# Patient Record
Sex: MALE | Race: WHITE | NOT HISPANIC OR LATINO | Employment: OTHER | URBAN - METROPOLITAN AREA
[De-identification: names, ages, dates, MRNs, and addresses within clinical notes are randomized per-mention and may not be internally consistent; named-entity substitution may affect disease eponyms.]

---

## 2023-11-06 PROBLEM — Z95.0 CARDIAC PACEMAKER: Status: ACTIVE | Noted: 2023-11-06

## 2023-11-06 PROBLEM — R60.0 BILATERAL LOWER EXTREMITY EDEMA: Status: ACTIVE | Noted: 2023-11-06

## 2023-11-06 PROBLEM — I25.10 CAD (CORONARY ARTERY DISEASE): Status: ACTIVE | Noted: 2023-11-06

## 2023-11-06 PROBLEM — E78.5 DYSLIPIDEMIA: Status: ACTIVE | Noted: 2023-11-06

## 2023-11-06 PROBLEM — I71.20 THORACIC AORTIC ANEURYSM WITHOUT RUPTURE (CMS-HCC): Status: ACTIVE | Noted: 2023-11-06

## 2023-11-06 RX ORDER — BUSPIRONE HYDROCHLORIDE 15 MG/1
30 TABLET ORAL 2 TIMES DAILY
COMMUNITY

## 2023-11-06 RX ORDER — CLOPIDOGREL BISULFATE 75 MG/1
75 TABLET ORAL EVERY MORNING
COMMUNITY
End: 2023-11-07 | Stop reason: WASHOUT

## 2023-11-06 RX ORDER — NAPROXEN SODIUM 220 MG/1
81 TABLET, FILM COATED ORAL EVERY MORNING
COMMUNITY

## 2023-11-06 RX ORDER — LIDOCAINE 40 MG/G
CREAM TOPICAL
COMMUNITY
Start: 2022-03-12 | End: 2023-12-13 | Stop reason: ENTERED-IN-ERROR

## 2023-11-06 RX ORDER — BUSPIRONE HYDROCHLORIDE 30 MG/1
TABLET ORAL
COMMUNITY
End: 2023-12-13 | Stop reason: ENTERED-IN-ERROR

## 2023-11-06 RX ORDER — EZETIMIBE 10 MG/1
1 TABLET ORAL DAILY
COMMUNITY
Start: 2021-08-19

## 2023-11-06 RX ORDER — ESOMEPRAZOLE MAGNESIUM 40 MG/1
40 CAPSULE, DELAYED RELEASE ORAL DAILY PRN
COMMUNITY

## 2023-11-06 RX ORDER — FUROSEMIDE 20 MG/1
20 TABLET ORAL DAILY PRN
COMMUNITY
Start: 2020-12-19

## 2023-11-06 RX ORDER — METOPROLOL TARTRATE 25 MG/1
12.5 TABLET, FILM COATED ORAL 2 TIMES DAILY
COMMUNITY
Start: 2020-12-19

## 2023-11-06 RX ORDER — FLUOXETINE HYDROCHLORIDE 20 MG/1
60 CAPSULE ORAL EVERY MORNING
COMMUNITY

## 2023-11-06 RX ORDER — ALBUTEROL SULFATE 90 UG/1
2 AEROSOL, METERED RESPIRATORY (INHALATION) EVERY 4 HOURS PRN
COMMUNITY
Start: 2021-02-10

## 2023-11-06 RX ORDER — MULTIVIT-MIN/IRON FUM/FOLIC AC 7.5 MG-4
1 TABLET ORAL DAILY
COMMUNITY

## 2023-11-06 RX ORDER — ACETAMINOPHEN 500 MG
2000 TABLET ORAL EVERY MORNING
COMMUNITY

## 2023-11-06 RX ORDER — ROSUVASTATIN CALCIUM 40 MG/1
1 TABLET, COATED ORAL NIGHTLY
COMMUNITY

## 2023-11-06 RX ORDER — AMLODIPINE BESYLATE 2.5 MG/1
2.5 TABLET ORAL EVERY MORNING
COMMUNITY

## 2023-11-06 RX ORDER — NITROGLYCERIN 0.4 MG/1
TABLET SUBLINGUAL
COMMUNITY
Start: 2020-12-21

## 2023-11-06 RX ORDER — LORAZEPAM 1 MG/1
1 TABLET ORAL DAILY PRN
COMMUNITY

## 2023-11-07 ENCOUNTER — ANCILLARY PROCEDURE (OUTPATIENT)
Dept: CARDIOLOGY | Facility: CLINIC | Age: 58
End: 2023-11-07
Payer: OTHER GOVERNMENT

## 2023-11-07 ENCOUNTER — OFFICE VISIT (OUTPATIENT)
Dept: CARDIOLOGY | Facility: CLINIC | Age: 58
End: 2023-11-07
Payer: OTHER GOVERNMENT

## 2023-11-07 VITALS
SYSTOLIC BLOOD PRESSURE: 90 MMHG | HEART RATE: 72 BPM | BODY MASS INDEX: 36.29 KG/M2 | WEIGHT: 231.2 LBS | DIASTOLIC BLOOD PRESSURE: 68 MMHG | OXYGEN SATURATION: 97 % | TEMPERATURE: 97.8 F | HEIGHT: 67 IN

## 2023-11-07 DIAGNOSIS — Z95.0 CARDIAC PACEMAKER IN SITU: ICD-10-CM

## 2023-11-07 DIAGNOSIS — Z95.0 CARDIAC PACEMAKER: Primary | ICD-10-CM

## 2023-11-07 DIAGNOSIS — I49.5 SINOATRIAL NODE DYSFUNCTION (MULTI): ICD-10-CM

## 2023-11-07 PROCEDURE — 99213 OFFICE O/P EST LOW 20 MIN: CPT | Performed by: INTERNAL MEDICINE

## 2023-11-07 PROCEDURE — 93279 PRGRMG DEV EVAL PM/LDLS PM: CPT | Performed by: INTERNAL MEDICINE

## 2023-11-07 PROCEDURE — 1036F TOBACCO NON-USER: CPT | Performed by: INTERNAL MEDICINE

## 2023-11-07 RX ORDER — ACETAMINOPHEN 325 MG/1
325-650 TABLET ORAL EVERY 4 HOURS PRN
COMMUNITY
Start: 2018-05-08

## 2023-11-07 RX ORDER — PANTOPRAZOLE SODIUM 40 MG/1
40 TABLET, DELAYED RELEASE ORAL
COMMUNITY
Start: 2017-04-06

## 2023-11-07 RX ORDER — FLUTICASONE PROPIONATE 50 MCG
1 SPRAY, SUSPENSION (ML) NASAL DAILY PRN
COMMUNITY
Start: 2023-01-27

## 2023-11-07 NOTE — PROGRESS NOTES
Subjective:  The patient is a 58-year-old white male, followed primarily by Dr. Louie Alva and from a cardiology standpoint by Dr. Zach Gomez, who presents for pacemaker follow-up.  The patient has a history of longstanding sinus node dysfunction, of uncertain etiology, perhaps an adverse reaction to smallpox vaccine according to the patient.  He underwent Medtronic AAIR pacemaker placement in April 2006 with a generator replacement in January 2016, both of which were done through the [facility] system.  His device function has been normal, though his current unit is technically programmed VVIR with the atrial lead and the ventricular port of a single-chamber device, since AAIR was not a programming option.    The patient's history is also remarkable for hypertension, hyperlipidemia, and a large thoracic aortic, and progressive coronary disease requiring single-vessel CABG (SVG to PDA) in December 2020 at [facility] by Dr. Merlin Brewster, at the same time as bioprosthetic aortic valve replacement for significant aortic stenosis.    The patient is  and lives at home with his wife in [facility] [place] [facility].  He was in the  in the past, serving with the [employer] and then the [employer].    Current Outpatient Medications   Medication Sig    acetaminophen (Tylenol) 325 mg tablet Take 1-2 tablets (325-650 mg) by mouth every 4 hours if needed.    albuterol 90 mcg/actuation inhaler Inhale 1 to 2 puffs every 4 to 5 hours as needed    amLODIPine (Norvasc) 2.5 mg tablet Take 1 tablet (2.5 mg) by mouth once daily in the morning.    busPIRone (Buspar) 30 mg tablet 1 tab(s) orally once a day (in the morning)    cholecalciferol (Vitamin D-3) 50 mcg (2,000 unit) capsule Take 1 capsule (2,000 Units) by mouth once daily in the morning.    esomeprazole (NexIUM) 20 mg DR capsule 1 cap(s) orally once a day, As Needed    ezetimibe (Zetia) 10 mg tablet Take 1 tablet (10 mg) by mouth once daily.     "FLUoxetine (PROzac) 60 mg tablet Take 1 tablet (60 mg) by mouth once daily in the morning.    fluticasone (Flonase) 50 mcg/actuation nasal spray Administer 1 spray into each nostril 2 times a day.    furosemide (Lasix) 20 mg tablet Take 1 tablet (20 mg) by mouth once daily as needed.    LORazepam (Ativan) 1 mg tablet Take 1 tablet (1 mg) by mouth twice a day.    metoprolol tartrate (Lopressor) 25 mg tablet Take 12.5 tablets (312.5 mg) by mouth 2 times a day.    multivitamin with minerals (multivit-min-iron fum-folic ac) tablet Take 1 tablet by mouth once daily.    nitroglycerin (Nitrostat) 0.4 mg SL tablet DISSOLVE 1 TABLET UNDER THE TONGUE AS NEEDED FOR CHEST PAIN    rosuvastatin (Crestor) 40 mg tablet Take 1 tablet (40 mg) by mouth once daily.    aspirin 81 mg chewable tablet Chew 1 tablet (81 mg) once daily in the morning.    busPIRone (Buspar) 10 mg tablet 2 tab(s) orally once a day (in the evening)    lidocaine 4 % cream Apply topically to affected area 3 times a day    pantoprazole (ProtoNix) 40 mg EC tablet Take 1 tablet (40 mg) by mouth once daily.     Allergies:  Ace inhibitors     Patient Active Problem List   Diagnosis    Bilateral lower extremity edema    CAD (coronary artery disease)    Dyslipidemia    Thoracic aortic aneurysm without rupture (CMS/HCC)    Cardiac pacemaker     Objective:  Vitals:    11/07/23 1536   BP: 90/68   Pulse: 72   Temp: 36.6 °C (97.8 °F)   SpO2: 97%   Height:     1.702 m (5' 7\")  Weight: 105 kg (231 lb 3.2 oz)     Exam:  Gen: Pleasant stocky middle-age gentleman in no distress.  HEENT: No scleral icterus.  Neck: No jugular venous distention or thyromegaly.  Left subclavian pacemaker pocket: Oblique incisional scar with deep generator.  Lungs: Clear to auscultation, with no wheezes or rales.  Heart: Regular rhythm with positive S4 and grade 1/6 systolic ejection murmur with preserved S2.  Abdomen: Benign, with no organomegaly or masses.  Extremities: Intact distal pulses; no " edema.  Neuro: No focal neurologic abnormalities.  Skin: No cutaneous lesions.    Device Check:  The single-lead pacemaker is functioning normally in the AAIR mode between 70 bpm and 130 bpm.  His rate adaptive feature was turned on in the spring 2023, the patient now has 94.5% atrial pacing with a better heart rate distribution than previously.  He has battery longevity is estimated at 7 months.    Impressions:  1.  Hypertension, under excellent control.  2.  Aortic valve disease, status post bioprosthetic aortic valve replacement in 2020, followed by Dr. Zach Gomez.  3.  Coronary artery disease, status post single-vessel CABG at time of aortic valve replacement.  4.  Sinus node dysfunction, status post Medtronic AAIR pacemakers in April 2006 and January 2016.  The device may well reached the point of battery depletion in early 2024.  5.  Hyperlipidemia, on statin therapy.  6.  Mild obesity.    Recommendations:  1.  The patient will follow-up in 4 to 6 months for another pacemaker check.  2.  When he reaches the elective replacement indicator of his device, I will set him up for a generator replacement.  A left upper extremity venogram will be obtained if the subclavian vein is widely patent, I will favor an upgrade to a dual-chamber system, as I discussed with him today.  3.      Leroy Delgado MD

## 2023-11-09 ENCOUNTER — HOSPITAL ENCOUNTER (OUTPATIENT)
Dept: CARDIOLOGY | Facility: HOSPITAL | Age: 58
Discharge: HOME | End: 2023-11-09
Payer: OTHER GOVERNMENT

## 2023-11-09 DIAGNOSIS — Z95.2 PRESENCE OF PROSTHETIC HEART VALVE: ICD-10-CM

## 2023-11-09 LAB
AORTIC VALVE MEAN GRADIENT: 9
AORTIC VALVE PEAK VELOCITY: 2.06
AV PEAK GRADIENT: 17
AVA (PEAK VEL): 1.46
AVA (VTI): 1.66
EJECTION FRACTION APICAL 4 CHAMBER: 48.7
EJECTION FRACTION: 49
LEFT ATRIUM VOLUME AREA LENGTH INDEX BSA: 16.8
LEFT VENTRICLE INTERNAL DIMENSION DIASTOLE: 3.69 (ref 3.5–6)
LEFT VENTRICULAR OUTFLOW TRACT DIAMETER: 2.3
MITRAL VALVE E/A RATIO: 0.99
MITRAL VALVE E/E' RATIO: 6.6
RIGHT VENTRICLE FREE WALL PEAK S': 8.81
RIGHT VENTRICLE PEAK SYSTOLIC PRESSURE: 23.3
TRICUSPID ANNULAR PLANE SYSTOLIC EXCURSION: 1.2

## 2023-11-09 PROCEDURE — 93306 TTE W/DOPPLER COMPLETE: CPT

## 2023-11-09 PROCEDURE — 93306 TTE W/DOPPLER COMPLETE: CPT | Performed by: INTERNAL MEDICINE

## 2023-11-13 ENCOUNTER — TELEPHONE (OUTPATIENT)
Dept: CARDIOLOGY | Facility: CLINIC | Age: 58
End: 2023-11-13
Payer: OTHER GOVERNMENT

## 2023-11-13 NOTE — TELEPHONE ENCOUNTER
Called and spoke with patient in regards to results/recommendations. Understanding was verbalized.

## 2023-11-13 NOTE — TELEPHONE ENCOUNTER
----- Message from Pippa Deshpande MA sent at 11/10/2023  4:27 PM EST -----  Called and left VM for patient to return call to the office in regards to results.  ----- Message -----  From: Zach Gomez MD  Sent: 11/10/2023   4:07 PM EST  To: Concepcion Diaz; #    Please call the patient know that the echocardiogram looked good.  The aortic valve looks like it is functioning normally.  No changes needed.  Follow-up as scheduled

## 2023-12-13 ENCOUNTER — APPOINTMENT (OUTPATIENT)
Dept: CARDIOLOGY | Facility: HOSPITAL | Age: 58
DRG: 287 | End: 2023-12-13
Payer: OTHER GOVERNMENT

## 2023-12-13 ENCOUNTER — HOSPITAL ENCOUNTER (INPATIENT)
Facility: HOSPITAL | Age: 58
LOS: 1 days | Discharge: HOME | DRG: 287 | End: 2023-12-14
Attending: STUDENT IN AN ORGANIZED HEALTH CARE EDUCATION/TRAINING PROGRAM | Admitting: STUDENT IN AN ORGANIZED HEALTH CARE EDUCATION/TRAINING PROGRAM
Payer: OTHER GOVERNMENT

## 2023-12-13 ENCOUNTER — APPOINTMENT (OUTPATIENT)
Dept: RADIOLOGY | Facility: HOSPITAL | Age: 58
DRG: 287 | End: 2023-12-13
Payer: OTHER GOVERNMENT

## 2023-12-13 DIAGNOSIS — R07.9 CHEST PAIN, UNSPECIFIED TYPE: Primary | ICD-10-CM

## 2023-12-13 DIAGNOSIS — I21.3 STEMI (ST ELEVATION MYOCARDIAL INFARCTION) (MULTI): ICD-10-CM

## 2023-12-13 DIAGNOSIS — I20.0 UNSTABLE ANGINA (MULTI): ICD-10-CM

## 2023-12-13 LAB
ALBUMIN SERPL BCP-MCNC: 4.3 G/DL (ref 3.4–5)
ALP SERPL-CCNC: 53 U/L (ref 33–120)
ALT SERPL W P-5'-P-CCNC: 46 U/L (ref 10–52)
ANION GAP SERPL CALC-SCNC: 11 MMOL/L (ref 10–20)
AST SERPL W P-5'-P-CCNC: 36 U/L (ref 9–39)
BASOPHILS # BLD AUTO: 0.06 X10*3/UL (ref 0–0.1)
BASOPHILS NFR BLD AUTO: 0.5 %
BILIRUB SERPL-MCNC: 0.7 MG/DL (ref 0–1.2)
BUN SERPL-MCNC: 14 MG/DL (ref 6–23)
CALCIUM SERPL-MCNC: 9.3 MG/DL (ref 8.6–10.3)
CARDIAC TROPONIN I PNL SERPL HS: 3 NG/L (ref 0–20)
CARDIAC TROPONIN I PNL SERPL HS: 3 NG/L (ref 0–20)
CHLORIDE SERPL-SCNC: 99 MMOL/L (ref 98–107)
CHOLEST SERPL-MCNC: 105 MG/DL (ref 0–199)
CHOLESTEROL/HDL RATIO: 3.4
CO2 SERPL-SCNC: 30 MMOL/L (ref 21–32)
CREAT SERPL-MCNC: 0.92 MG/DL (ref 0.5–1.3)
D DIMER PPP FEU-MCNC: 450 NG/ML FEU
EOSINOPHIL # BLD AUTO: 0.2 X10*3/UL (ref 0–0.7)
EOSINOPHIL NFR BLD AUTO: 1.5 %
ERYTHROCYTE [DISTWIDTH] IN BLOOD BY AUTOMATED COUNT: 13.2 % (ref 11.5–14.5)
FLUAV RNA RESP QL NAA+PROBE: NOT DETECTED
FLUBV RNA RESP QL NAA+PROBE: NOT DETECTED
GFR SERPL CREATININE-BSD FRML MDRD: >90 ML/MIN/1.73M*2
GLUCOSE SERPL-MCNC: 127 MG/DL (ref 74–99)
HCT VFR BLD AUTO: 47.6 % (ref 41–52)
HDLC SERPL-MCNC: 30.5 MG/DL
HGB BLD-MCNC: 15.5 G/DL (ref 13.5–17.5)
HOLD SPECIMEN: NORMAL
IMM GRANULOCYTES # BLD AUTO: 0.03 X10*3/UL (ref 0–0.7)
IMM GRANULOCYTES NFR BLD AUTO: 0.2 % (ref 0–0.9)
LDLC SERPL CALC-MCNC: 55 MG/DL
LYMPHOCYTES # BLD AUTO: 1.44 X10*3/UL (ref 1.2–4.8)
LYMPHOCYTES NFR BLD AUTO: 11 %
MCH RBC QN AUTO: 30.6 PG (ref 26–34)
MCHC RBC AUTO-ENTMCNC: 32.6 G/DL (ref 32–36)
MCV RBC AUTO: 94 FL (ref 80–100)
MONOCYTES # BLD AUTO: 0.81 X10*3/UL (ref 0.1–1)
MONOCYTES NFR BLD AUTO: 6.2 %
NEUTROPHILS # BLD AUTO: 10.5 X10*3/UL (ref 1.2–7.7)
NEUTROPHILS NFR BLD AUTO: 80.6 %
NON HDL CHOLESTEROL: 75 MG/DL (ref 0–149)
NRBC BLD-RTO: 0 /100 WBCS (ref 0–0)
PLATELET # BLD AUTO: 260 X10*3/UL (ref 150–450)
POTASSIUM SERPL-SCNC: 3.6 MMOL/L (ref 3.5–5.3)
PROT SERPL-MCNC: 6.9 G/DL (ref 6.4–8.2)
RBC # BLD AUTO: 5.06 X10*6/UL (ref 4.5–5.9)
SARS-COV-2 RNA RESP QL NAA+PROBE: NOT DETECTED
SODIUM SERPL-SCNC: 136 MMOL/L (ref 136–145)
TRIGL SERPL-MCNC: 96 MG/DL (ref 0–149)
VLDL: 19 MG/DL (ref 0–40)
WBC # BLD AUTO: 13 X10*3/UL (ref 4.4–11.3)

## 2023-12-13 PROCEDURE — 93005 ELECTROCARDIOGRAM TRACING: CPT

## 2023-12-13 PROCEDURE — 85379 FIBRIN DEGRADATION QUANT: CPT | Performed by: STUDENT IN AN ORGANIZED HEALTH CARE EDUCATION/TRAINING PROGRAM

## 2023-12-13 PROCEDURE — 84484 ASSAY OF TROPONIN QUANT: CPT | Performed by: STUDENT IN AN ORGANIZED HEALTH CARE EDUCATION/TRAINING PROGRAM

## 2023-12-13 PROCEDURE — 2060000001 HC INTERMEDIATE ICU ROOM DAILY

## 2023-12-13 PROCEDURE — 93455 CORONARY ART/GRFT ANGIO S&I: CPT | Performed by: INTERNAL MEDICINE

## 2023-12-13 PROCEDURE — 80053 COMPREHEN METABOLIC PANEL: CPT | Performed by: STUDENT IN AN ORGANIZED HEALTH CARE EDUCATION/TRAINING PROGRAM

## 2023-12-13 PROCEDURE — 93010 ELECTROCARDIOGRAM REPORT: CPT | Performed by: INTERNAL MEDICINE

## 2023-12-13 PROCEDURE — 2500000004 HC RX 250 GENERAL PHARMACY W/ HCPCS (ALT 636 FOR OP/ED)

## 2023-12-13 PROCEDURE — 2500000005 HC RX 250 GENERAL PHARMACY W/O HCPCS: Performed by: INTERNAL MEDICINE

## 2023-12-13 PROCEDURE — 85025 COMPLETE CBC W/AUTO DIFF WBC: CPT | Performed by: STUDENT IN AN ORGANIZED HEALTH CARE EDUCATION/TRAINING PROGRAM

## 2023-12-13 PROCEDURE — 99152 MOD SED SAME PHYS/QHP 5/>YRS: CPT | Performed by: INTERNAL MEDICINE

## 2023-12-13 PROCEDURE — 2500000001 HC RX 250 WO HCPCS SELF ADMINISTERED DRUGS (ALT 637 FOR MEDICARE OP): Performed by: STUDENT IN AN ORGANIZED HEALTH CARE EDUCATION/TRAINING PROGRAM

## 2023-12-13 PROCEDURE — 36415 COLL VENOUS BLD VENIPUNCTURE: CPT | Performed by: STUDENT IN AN ORGANIZED HEALTH CARE EDUCATION/TRAINING PROGRAM

## 2023-12-13 PROCEDURE — 99153 MOD SED SAME PHYS/QHP EA: CPT | Performed by: INTERNAL MEDICINE

## 2023-12-13 PROCEDURE — 99235 HOSP IP/OBS SAME DATE MOD 70: CPT | Performed by: STUDENT IN AN ORGANIZED HEALTH CARE EDUCATION/TRAINING PROGRAM

## 2023-12-13 PROCEDURE — B2111ZZ FLUOROSCOPY OF MULTIPLE CORONARY ARTERIES USING LOW OSMOLAR CONTRAST: ICD-10-PCS | Performed by: INTERNAL MEDICINE

## 2023-12-13 PROCEDURE — C1769 GUIDE WIRE: HCPCS | Performed by: INTERNAL MEDICINE

## 2023-12-13 PROCEDURE — C1887 CATHETER, GUIDING: HCPCS | Performed by: INTERNAL MEDICINE

## 2023-12-13 PROCEDURE — 2720000007 HC OR 272 NO HCPCS: Performed by: INTERNAL MEDICINE

## 2023-12-13 PROCEDURE — 76937 US GUIDE VASCULAR ACCESS: CPT | Performed by: INTERNAL MEDICINE

## 2023-12-13 PROCEDURE — 71045 X-RAY EXAM CHEST 1 VIEW: CPT | Mod: FY

## 2023-12-13 PROCEDURE — 87636 SARSCOV2 & INF A&B AMP PRB: CPT | Performed by: STUDENT IN AN ORGANIZED HEALTH CARE EDUCATION/TRAINING PROGRAM

## 2023-12-13 PROCEDURE — 2500000004 HC RX 250 GENERAL PHARMACY W/ HCPCS (ALT 636 FOR OP/ED): Performed by: STUDENT IN AN ORGANIZED HEALTH CARE EDUCATION/TRAINING PROGRAM

## 2023-12-13 PROCEDURE — 80061 LIPID PANEL: CPT | Performed by: STUDENT IN AN ORGANIZED HEALTH CARE EDUCATION/TRAINING PROGRAM

## 2023-12-13 PROCEDURE — 99285 EMERGENCY DEPT VISIT HI MDM: CPT | Mod: 25 | Performed by: STUDENT IN AN ORGANIZED HEALTH CARE EDUCATION/TRAINING PROGRAM

## 2023-12-13 PROCEDURE — 2550000001 HC RX 255 CONTRASTS: Performed by: INTERNAL MEDICINE

## 2023-12-13 PROCEDURE — 96374 THER/PROPH/DIAG INJ IV PUSH: CPT | Mod: 59

## 2023-12-13 PROCEDURE — 99285 EMERGENCY DEPT VISIT HI MDM: CPT | Performed by: STUDENT IN AN ORGANIZED HEALTH CARE EDUCATION/TRAINING PROGRAM

## 2023-12-13 PROCEDURE — 71045 X-RAY EXAM CHEST 1 VIEW: CPT | Mod: COMPUTED RADIOGRAPHY X-RAY | Performed by: RADIOLOGY

## 2023-12-13 PROCEDURE — 2500000004 HC RX 250 GENERAL PHARMACY W/ HCPCS (ALT 636 FOR OP/ED): Performed by: INTERNAL MEDICINE

## 2023-12-13 PROCEDURE — 96372 THER/PROPH/DIAG INJ SC/IM: CPT | Performed by: STUDENT IN AN ORGANIZED HEALTH CARE EDUCATION/TRAINING PROGRAM

## 2023-12-13 PROCEDURE — 99223 1ST HOSP IP/OBS HIGH 75: CPT | Performed by: INTERNAL MEDICINE

## 2023-12-13 PROCEDURE — C1894 INTRO/SHEATH, NON-LASER: HCPCS | Performed by: INTERNAL MEDICINE

## 2023-12-13 PROCEDURE — B2131ZZ FLUOROSCOPY OF MULTIPLE CORONARY ARTERY BYPASS GRAFTS USING LOW OSMOLAR CONTRAST: ICD-10-PCS | Performed by: INTERNAL MEDICINE

## 2023-12-13 PROCEDURE — 96373 THER/PROPH/DIAG INJ IA: CPT | Performed by: INTERNAL MEDICINE

## 2023-12-13 RX ORDER — LIDOCAINE HYDROCHLORIDE 20 MG/ML
INJECTION, SOLUTION INFILTRATION; PERINEURAL AS NEEDED
Status: DISCONTINUED | OUTPATIENT
Start: 2023-12-13 | End: 2023-12-13 | Stop reason: HOSPADM

## 2023-12-13 RX ORDER — ENOXAPARIN SODIUM 100 MG/ML
40 INJECTION SUBCUTANEOUS EVERY 24 HOURS
Status: DISCONTINUED | OUTPATIENT
Start: 2023-12-13 | End: 2023-12-14 | Stop reason: HOSPADM

## 2023-12-13 RX ORDER — PANTOPRAZOLE SODIUM 40 MG/1
40 TABLET, DELAYED RELEASE ORAL DAILY
Status: DISCONTINUED | OUTPATIENT
Start: 2023-12-14 | End: 2023-12-14 | Stop reason: HOSPADM

## 2023-12-13 RX ORDER — HEPARIN SODIUM 5000 [USP'U]/ML
4000 INJECTION, SOLUTION INTRAVENOUS; SUBCUTANEOUS ONCE
Status: COMPLETED | OUTPATIENT
Start: 2023-12-13 | End: 2023-12-13

## 2023-12-13 RX ORDER — METOPROLOL TARTRATE 25 MG/1
12.5 TABLET, FILM COATED ORAL 2 TIMES DAILY
Status: DISCONTINUED | OUTPATIENT
Start: 2023-12-13 | End: 2023-12-14 | Stop reason: HOSPADM

## 2023-12-13 RX ORDER — MIDAZOLAM HYDROCHLORIDE 1 MG/ML
INJECTION INTRAMUSCULAR; INTRAVENOUS AS NEEDED
Status: DISCONTINUED | OUTPATIENT
Start: 2023-12-13 | End: 2023-12-13 | Stop reason: HOSPADM

## 2023-12-13 RX ORDER — ROSUVASTATIN CALCIUM 20 MG/1
40 TABLET, COATED ORAL NIGHTLY
Status: DISCONTINUED | OUTPATIENT
Start: 2023-12-13 | End: 2023-12-14 | Stop reason: HOSPADM

## 2023-12-13 RX ORDER — ALUMINUM HYDROXIDE, MAGNESIUM HYDROXIDE, AND SIMETHICONE 1200; 120; 1200 MG/30ML; MG/30ML; MG/30ML
20 SUSPENSION ORAL ONCE
Status: DISCONTINUED | OUTPATIENT
Start: 2023-12-13 | End: 2023-12-14 | Stop reason: HOSPADM

## 2023-12-13 RX ORDER — EZETIMIBE 10 MG/1
10 TABLET ORAL DAILY
Status: DISCONTINUED | OUTPATIENT
Start: 2023-12-14 | End: 2023-12-14 | Stop reason: HOSPADM

## 2023-12-13 RX ORDER — NITROGLYCERIN 5 MG/ML
INJECTION, SOLUTION INTRAVENOUS AS NEEDED
Status: DISCONTINUED | OUTPATIENT
Start: 2023-12-13 | End: 2023-12-13 | Stop reason: HOSPADM

## 2023-12-13 RX ORDER — FLUOXETINE HYDROCHLORIDE 20 MG/1
60 CAPSULE ORAL EVERY MORNING
Status: DISCONTINUED | OUTPATIENT
Start: 2023-12-14 | End: 2023-12-14 | Stop reason: HOSPADM

## 2023-12-13 RX ORDER — NITROGLYCERIN 0.4 MG/1
0.4 TABLET SUBLINGUAL EVERY 5 MIN PRN
Status: DISCONTINUED | OUTPATIENT
Start: 2023-12-13 | End: 2023-12-14 | Stop reason: HOSPADM

## 2023-12-13 RX ORDER — AMLODIPINE BESYLATE 2.5 MG/1
2.5 TABLET ORAL EVERY MORNING
Status: DISCONTINUED | OUTPATIENT
Start: 2023-12-14 | End: 2023-12-14 | Stop reason: HOSPADM

## 2023-12-13 RX ORDER — PANTOPRAZOLE SODIUM 40 MG/1
40 TABLET, DELAYED RELEASE ORAL
Status: DISCONTINUED | OUTPATIENT
Start: 2023-12-14 | End: 2023-12-13 | Stop reason: SDUPTHER

## 2023-12-13 RX ORDER — LORAZEPAM 1 MG/1
1 TABLET ORAL DAILY PRN
Status: DISCONTINUED | OUTPATIENT
Start: 2023-12-13 | End: 2023-12-14 | Stop reason: HOSPADM

## 2023-12-13 RX ORDER — FENTANYL CITRATE 50 UG/ML
INJECTION, SOLUTION INTRAMUSCULAR; INTRAVENOUS AS NEEDED
Status: DISCONTINUED | OUTPATIENT
Start: 2023-12-13 | End: 2023-12-13 | Stop reason: HOSPADM

## 2023-12-13 RX ORDER — BUSPIRONE HYDROCHLORIDE 15 MG/1
30 TABLET ORAL 2 TIMES DAILY
Status: DISCONTINUED | OUTPATIENT
Start: 2023-12-13 | End: 2023-12-14 | Stop reason: HOSPADM

## 2023-12-13 RX ORDER — ALBUTEROL SULFATE 90 UG/1
2 AEROSOL, METERED RESPIRATORY (INHALATION) EVERY 4 HOURS PRN
Status: DISCONTINUED | OUTPATIENT
Start: 2023-12-13 | End: 2023-12-13 | Stop reason: CLARIF

## 2023-12-13 RX ORDER — PANTOPRAZOLE SODIUM 40 MG/10ML
40 INJECTION, POWDER, LYOPHILIZED, FOR SOLUTION INTRAVENOUS ONCE
Status: DISCONTINUED | OUTPATIENT
Start: 2023-12-13 | End: 2023-12-14 | Stop reason: HOSPADM

## 2023-12-13 RX ORDER — ALBUTEROL SULFATE 0.83 MG/ML
2.5 SOLUTION RESPIRATORY (INHALATION) EVERY 4 HOURS PRN
Status: DISCONTINUED | OUTPATIENT
Start: 2023-12-13 | End: 2023-12-14 | Stop reason: HOSPADM

## 2023-12-13 RX ORDER — NAPROXEN SODIUM 220 MG/1
243 TABLET, FILM COATED ORAL ONCE
Status: COMPLETED | OUTPATIENT
Start: 2023-12-13 | End: 2023-12-13

## 2023-12-13 RX ORDER — NAPROXEN SODIUM 220 MG/1
81 TABLET, FILM COATED ORAL EVERY MORNING
Status: DISCONTINUED | OUTPATIENT
Start: 2023-12-14 | End: 2023-12-14 | Stop reason: HOSPADM

## 2023-12-13 RX ADMIN — HEPARIN SODIUM 4000 UNITS: 5000 INJECTION INTRAVENOUS; SUBCUTANEOUS at 17:13

## 2023-12-13 RX ADMIN — ASPIRIN 81 MG CHEWABLE TABLET 243 MG: 81 TABLET CHEWABLE at 15:52

## 2023-12-13 RX ADMIN — LORAZEPAM 1 MG: 1 TABLET ORAL at 20:17

## 2023-12-13 RX ADMIN — HYDROMORPHONE HYDROCHLORIDE 0.4 MG: 1 INJECTION, SOLUTION INTRAMUSCULAR; INTRAVENOUS; SUBCUTANEOUS at 20:28

## 2023-12-13 RX ADMIN — METOPROLOL TARTRATE 12.5 MG: 25 TABLET, FILM COATED ORAL at 20:16

## 2023-12-13 RX ADMIN — NITROGLYCERIN 0.4 MG: 0.4 TABLET SUBLINGUAL at 15:53

## 2023-12-13 RX ADMIN — ENOXAPARIN SODIUM 40 MG: 40 INJECTION SUBCUTANEOUS at 20:17

## 2023-12-13 RX ADMIN — HYDROMORPHONE HYDROCHLORIDE 0.5 MG: 1 INJECTION, SOLUTION INTRAMUSCULAR; INTRAVENOUS; SUBCUTANEOUS at 23:47

## 2023-12-13 RX ADMIN — BUSPIRONE HYDROCHLORIDE 30 MG: 15 TABLET ORAL at 20:16

## 2023-12-13 RX ADMIN — ROSUVASTATIN CALCIUM 40 MG: 20 TABLET, FILM COATED ORAL at 20:16

## 2023-12-13 SDOH — SOCIAL STABILITY: SOCIAL INSECURITY: ARE THERE ANY APPARENT SIGNS OF INJURIES/BEHAVIORS THAT COULD BE RELATED TO ABUSE/NEGLECT?: NO

## 2023-12-13 SDOH — SOCIAL STABILITY: SOCIAL INSECURITY: DO YOU FEEL ANYONE HAS EXPLOITED OR TAKEN ADVANTAGE OF YOU FINANCIALLY OR OF YOUR PERSONAL PROPERTY?: NO

## 2023-12-13 SDOH — SOCIAL STABILITY: SOCIAL INSECURITY: DOES ANYONE TRY TO KEEP YOU FROM HAVING/CONTACTING OTHER FRIENDS OR DOING THINGS OUTSIDE YOUR HOME?: NO

## 2023-12-13 SDOH — SOCIAL STABILITY: SOCIAL INSECURITY: HAVE YOU HAD THOUGHTS OF HARMING ANYONE ELSE?: NO

## 2023-12-13 SDOH — SOCIAL STABILITY: SOCIAL INSECURITY: WERE YOU ABLE TO COMPLETE ALL THE BEHAVIORAL HEALTH SCREENINGS?: YES

## 2023-12-13 SDOH — SOCIAL STABILITY: SOCIAL INSECURITY: DO YOU FEEL UNSAFE GOING BACK TO THE PLACE WHERE YOU ARE LIVING?: NO

## 2023-12-13 SDOH — SOCIAL STABILITY: SOCIAL INSECURITY: ABUSE: ADULT

## 2023-12-13 SDOH — SOCIAL STABILITY: SOCIAL INSECURITY: ARE YOU OR HAVE YOU BEEN THREATENED OR ABUSED PHYSICALLY, EMOTIONALLY, OR SEXUALLY BY ANYONE?: NO

## 2023-12-13 SDOH — SOCIAL STABILITY: SOCIAL INSECURITY: HAS ANYONE EVER THREATENED TO HURT YOUR FAMILY OR YOUR PETS?: NO

## 2023-12-13 ASSESSMENT — COGNITIVE AND FUNCTIONAL STATUS - GENERAL
MOBILITY SCORE: 24
DAILY ACTIVITIY SCORE: 24
PATIENT BASELINE BEDBOUND: NO

## 2023-12-13 ASSESSMENT — ACTIVITIES OF DAILY LIVING (ADL)
HEARING - RIGHT EAR: FUNCTIONAL
TOILETING: INDEPENDENT
HEARING - LEFT EAR: FUNCTIONAL
WALKS IN HOME: INDEPENDENT
BATHING: INDEPENDENT
JUDGMENT_ADEQUATE_SAFELY_COMPLETE_DAILY_ACTIVITIES: YES
DRESSING YOURSELF: INDEPENDENT
PATIENT'S MEMORY ADEQUATE TO SAFELY COMPLETE DAILY ACTIVITIES?: YES
GROOMING: INDEPENDENT
PATIENT'S MEMORY ADEQUATE TO SAFELY COMPLETE DAILY ACTIVITIES?: YES
DRESSING YOURSELF: INDEPENDENT
ADEQUATE_TO_COMPLETE_ADL: YES
BATHING: INDEPENDENT
TOILETING: INDEPENDENT
JUDGMENT_ADEQUATE_SAFELY_COMPLETE_DAILY_ACTIVITIES: YES
FEEDING YOURSELF: INDEPENDENT
HEARING - LEFT EAR: FUNCTIONAL
ADEQUATE_TO_COMPLETE_ADL: YES
GROOMING: INDEPENDENT
HEARING - RIGHT EAR: FUNCTIONAL
FEEDING YOURSELF: INDEPENDENT
WALKS IN HOME: INDEPENDENT

## 2023-12-13 ASSESSMENT — ENCOUNTER SYMPTOMS
GASTROINTESTINAL NEGATIVE: 1
MUSCULOSKELETAL NEGATIVE: 1
NEUROLOGICAL NEGATIVE: 1
CONSTITUTIONAL NEGATIVE: 1
SHORTNESS OF BREATH: 1

## 2023-12-13 ASSESSMENT — COLUMBIA-SUICIDE SEVERITY RATING SCALE - C-SSRS
2. HAVE YOU ACTUALLY HAD ANY THOUGHTS OF KILLING YOURSELF?: NO
1. IN THE PAST MONTH, HAVE YOU WISHED YOU WERE DEAD OR WISHED YOU COULD GO TO SLEEP AND NOT WAKE UP?: NO
2. HAVE YOU ACTUALLY HAD ANY THOUGHTS OF KILLING YOURSELF?: NO
1. IN THE PAST MONTH, HAVE YOU WISHED YOU WERE DEAD OR WISHED YOU COULD GO TO SLEEP AND NOT WAKE UP?: NO
6. HAVE YOU EVER DONE ANYTHING, STARTED TO DO ANYTHING, OR PREPARED TO DO ANYTHING TO END YOUR LIFE?: NO
6. HAVE YOU EVER DONE ANYTHING, STARTED TO DO ANYTHING, OR PREPARED TO DO ANYTHING TO END YOUR LIFE?: NO

## 2023-12-13 ASSESSMENT — PAIN DESCRIPTION - DESCRIPTORS: DESCRIPTORS: PRESSURE

## 2023-12-13 ASSESSMENT — LIFESTYLE VARIABLES
HAVE YOU EVER FELT YOU SHOULD CUT DOWN ON YOUR DRINKING: NO
HOW OFTEN DO YOU HAVE 6 OR MORE DRINKS ON ONE OCCASION: NEVER
AUDIT-C TOTAL SCORE: 0
SKIP TO QUESTIONS 9-10: 1
EVER HAD A DRINK FIRST THING IN THE MORNING TO STEADY YOUR NERVES TO GET RID OF A HANGOVER: NO
EVER FELT BAD OR GUILTY ABOUT YOUR DRINKING: NO
HOW MANY STANDARD DRINKS CONTAINING ALCOHOL DO YOU HAVE ON A TYPICAL DAY: PATIENT DOES NOT DRINK
REASON UNABLE TO ASSESS: NO
AUDIT-C TOTAL SCORE: 0
HOW OFTEN DO YOU HAVE A DRINK CONTAINING ALCOHOL: NEVER
HAVE PEOPLE ANNOYED YOU BY CRITICIZING YOUR DRINKING: NO

## 2023-12-13 ASSESSMENT — PAIN SCALES - GENERAL
PAINLEVEL_OUTOF10: 7
PAINLEVEL_OUTOF10: 0 - NO PAIN
PAINLEVEL_OUTOF10: 7
PAINLEVEL_OUTOF10: 0 - NO PAIN
PAINLEVEL_OUTOF10: 0 - NO PAIN
PAINLEVEL_OUTOF10: 7

## 2023-12-13 ASSESSMENT — PAIN - FUNCTIONAL ASSESSMENT
PAIN_FUNCTIONAL_ASSESSMENT: 0-10

## 2023-12-13 ASSESSMENT — PATIENT HEALTH QUESTIONNAIRE - PHQ9
SUM OF ALL RESPONSES TO PHQ9 QUESTIONS 1 & 2: 0
1. LITTLE INTEREST OR PLEASURE IN DOING THINGS: NOT AT ALL
2. FEELING DOWN, DEPRESSED OR HOPELESS: NOT AT ALL

## 2023-12-13 NOTE — SIGNIFICANT EVENT
Post Cath Lab  No evidence of acute ischemia and recommend we search for noncardiac sources of chest pain as he is still complaining of chest pain.      Maalox + Protonix to be given to help with heartburn and if not relieved we can get a D-dimer and if its >4000 we can consider ruling out aortic dissection because patient has a history of thoracic aneurysm measuring >4 cm  Plan  - Maalox + Protonix  - Follow-up D-dimer and if elevated (>4000 can consider ruling out aortic dissection as patient has history of thoracic aneurysm)    Leroy Perdomo MD   PGY 3 [facility]

## 2023-12-13 NOTE — H&P
History Of Present Illness  Otis Connors is a 58 y.o. male presents from home complaining of chest pain.      Was on his treadmill working out when he suddenly began experiencing retrosternal chest pain radiating to his neck and his left arm.  Pain described as pressure-like that is constant unremitting and associated with mild shortness of breath.  Took sublingual nitroglycerin at home which did not relieve the pain.  On arrival to the ED was given another dose of sublingual nitroglycerin and has not relieved the pain.    Cardiology was immediately called from the ED who upon review of his labs, EKG have decided to take him to cath.    ED course  Vitals: 36.6 Celsius, 70 bpm, 18 breaths/min, 102/55 mmHg on room air    Labs: CMP unremarkable.  CBC shows leukocytosis with elevated neutrophil absolute..  COVID/influenza negative.  EKG shows no acute ischemic changes    Imaging: None    Intervention  - Aspirin 325 mg x 1  - Heparin 400 units x 1  - Taken to Cath Lab immediately by cardiology    Medical history: CAD s/p CABG with AVR at [facility], sick sinus syndrome status post single-lead atrial pacemaker, hypertension, dyslipidemia, prediabetes, chronic kidney disease, obstructive sleep apnea intolerant to CPAP, thoracic aortic aneurysm and PTSD  Surgical history: Aortic valve replacement, appendectomy, CABG, pacemaker and insertion  Family history: CAD, HTN  Social history: Former smoker quit 2020.  Does not drink or consume illicit drugs    CODE STATUS: Full.     Review of Systems   Constitutional: Negative.    Respiratory:  Positive for shortness of breath.    Cardiovascular:  Positive for chest pain.   Gastrointestinal: Negative.    Genitourinary: Negative.    Musculoskeletal: Negative.    Neurological: Negative.         Physical Exam  Constitutional:       Appearance: Normal appearance.   HENT:      Head: Normocephalic.   Cardiovascular:      Rate and Rhythm: Normal rate and regular rhythm.       Heart sounds: Normal heart sounds. No murmur heard.  Pulmonary:      Effort: No respiratory distress.      Breath sounds: Normal breath sounds. No wheezing, rhonchi or rales.   Abdominal:      Palpations: Abdomen is soft.      Tenderness: There is no abdominal tenderness. There is no guarding.   Musculoskeletal:         General: No swelling or tenderness.      Right lower leg: No edema.      Left lower leg: No edema.   Skin:     General: Skin is dry.      Capillary Refill: Capillary refill takes less than 2 seconds.   Neurological:      General: No focal deficit present.      Mental Status: He is alert and oriented to person, place, and time.   Psychiatric:         Mood and Affect: Mood normal.          Relevant Results  Results for orders placed or performed during the hospital encounter of 12/13/23 (from the past 24 hour(s))   CBC with Differential   Result Value Ref Range    WBC 13.0 (H) 4.4 - 11.3 x10*3/uL    nRBC 0.0 0.0 - 0.0 /100 WBCs    RBC 5.06 4.50 - 5.90 x10*6/uL    Hemoglobin 15.5 13.5 - 17.5 g/dL    Hematocrit 47.6 41.0 - 52.0 %    MCV 94 80 - 100 fL    MCH 30.6 26.0 - 34.0 pg    MCHC 32.6 32.0 - 36.0 g/dL    RDW 13.2 11.5 - 14.5 %    Platelets 260 150 - 450 x10*3/uL    Neutrophils % 80.6 40.0 - 80.0 %    Immature Granulocytes %, Automated 0.2 0.0 - 0.9 %    Lymphocytes % 11.0 13.0 - 44.0 %    Monocytes % 6.2 2.0 - 10.0 %    Eosinophils % 1.5 0.0 - 6.0 %    Basophils % 0.5 0.0 - 2.0 %    Neutrophils Absolute 10.50 (H) 1.20 - 7.70 x10*3/uL    Immature Granulocytes Absolute, Automated 0.03 0.00 - 0.70 x10*3/uL    Lymphocytes Absolute 1.44 1.20 - 4.80 x10*3/uL    Monocytes Absolute 0.81 0.10 - 1.00 x10*3/uL    Eosinophils Absolute 0.20 0.00 - 0.70 x10*3/uL    Basophils Absolute 0.06 0.00 - 0.10 x10*3/uL   Comprehensive Metabolic Panel   Result Value Ref Range    Glucose 127 (H) 74 - 99 mg/dL    Sodium 136 136 - 145 mmol/L    Potassium 3.6 3.5 - 5.3 mmol/L    Chloride 99 98 - 107 mmol/L    Bicarbonate 30 21  - 32 mmol/L    Anion Gap 11 10 - 20 mmol/L    Urea Nitrogen 14 6 - 23 mg/dL    Creatinine 0.92 0.50 - 1.30 mg/dL    eGFR >90 >60 mL/min/1.73m*2    Calcium 9.3 8.6 - 10.3 mg/dL    Albumin 4.3 3.4 - 5.0 g/dL    Alkaline Phosphatase 53 33 - 120 U/L    Total Protein 6.9 6.4 - 8.2 g/dL    AST 36 9 - 39 U/L    Bilirubin, Total 0.7 0.0 - 1.2 mg/dL    ALT 46 10 - 52 U/L   Troponin I, High Sensitivity   Result Value Ref Range    Troponin I, High Sensitivity 3 0 - 20 ng/L   Light Blue Top   Result Value Ref Range    Extra Tube Hold for add-ons.    Sars-CoV-2 and Influenza A/B PCR   Result Value Ref Range    Flu A Result Not Detected Not Detected    Flu B Result Not Detected Not Detected    Coronavirus 2019, PCR Not Detected Not Detected   Troponin I, High Sensitivity   Result Value Ref Range    Troponin I, High Sensitivity 3 0 - 20 ng/L     Scheduled medications     Continuous medications     PRN medications  PRN medications: fentaNYL PF, lidocaine, midazolam, nitroglycerin, nitroglycerin      Assessment/Plan     ACS rule out  History of CAD s/p CABG  S/p aortic valve replacement  History concerning for ACS as described as retrosternal chest pain pressure-like radiating up his neck down his left arm.  Vitals after 2 sublingual nitroglycerin shows a blood pressure of 94/78 mmHg with a heart rate of 74 bpm.  Labs unremarkable except for leukocytosis and EKG that shows no acute ischemic changes.    Was given one-time dose of aspirin 325 mg followed by heparin 4000 units.    Patient discussed with to cardiology and in light of extensive medical history will be taken to Cath Lab for evaluation    Card history  CABG 12/15/2020: VG to PDA and aortic valve replacement with a #23 Inspiris Noonan bioprosthetic aortic valve     Catheterization 10/16/2020: Left main and LAD without significant disease. Circumflex with a long 60% eccentric stenosis in the proximal portion. RCA with 80% in-stent restenosis. Peak and mean gradients across  the aortic valve 59 and 35 mmHg.  Plan  - Cardiology consult    Sick sinus syndrome status post single-lead atrial pacemaker  Medtronic AAIR pacemaker in April 2006, with a generator replacement in January 2016.      Then subsequently developed coronary disease and progressive aortic stenosis and underwent single-vessel CABG (saphenous vein graft to posterior descending artery) and bioprosthetic aortic valve replacement in December 2020 at [facility].  Plan   - Continue to monitor     DVT prophylaxis: lovenox    Diet: NPO   Consults: Cardiology   CODE STATUS: FULL     Disposition: Admitted as ACS rule out with pending Cath Lab evaluation by cardiology    Leroy Perdomo MD   PGY 3 [facility]

## 2023-12-13 NOTE — CONSULTS
Consults  History Of Present Illness:    Otis Connors is a 58 y.o. male presenting with chest pain.    Patient has a history of coronary disease with PCI to the RCA and then CABG with vein graft to RCA at the time of his aortic valve replacement.  He had been doing well.  He has been exercising on a treadmill and has been feeling very good doing that.  Today he got on the treadmill he developed chest pain.  Pain was in the middle of the chest with radiation to the left arm.  He stopped the treadmill but his symptoms did not improve and he came to the ER for evaluation.  ECG did not demonstrate any significant changes.  Initial troponins were negative.  He was given nitroglycerin without relief.  I was called at that time.  Due to his history of CABG I was concerned that his vein graft may be the culprit.  Because he was having persistent chest pain despite very good blood pressure and heart rate control decision was made to bring of the Cath Lab emergently.    Catheterization revealed an occluded RCA with faint collaterals from the left system to the distal RCA.  I could not find the vein graft to the RCA and it was presumed to be occluded.    Past medical history includes CABG with AVR, sick sinus syndrome status post pacemaker, hypertension, dyslipidemia, chronic kidney disease, obstructive sleep apnea intolerant to CPAP and PTSD.    Echocardiogram 12/18/2020: EF 50% with mild global hypokinesis. Moderate concentric LVH. Trace MR and trace to 1+ TR. Normal functioning bioprosthetic valve     CABG 12/15/2020: VG to PDA and aortic valve replacement with a #23 Inspiris Noonan bioprosthetic aortic valve     Catheterization 10/16/2020: Left main and LAD without significant disease. Circumflex with a long 60% eccentric stenosis in the proximal portion. RCA with 80% in-stent restenosis. Peak and mean gradients across the aortic valve 59 and 35 mmHg.     Last Recorded Vitals:  Vitals:    12/13/23 1600 12/13/23 1630  12/13/23 1719 12/13/23 1721   BP: 93/59 95/59  120/80   BP Location:       Patient Position:       Pulse: 70 72  72   Resp: 24 12  16   Temp:       TempSrc:       SpO2: 97% 96% 100% 100%   Weight:       Height:           Last Labs:  CBC - 12/13/2023:  2:27 PM  13.0 15.5 260    47.6      CMP - 12/13/2023:  2:27 PM  9.3 6.9 36 --- 0.7   _ 4.3 46 53      PTT - No results in last year.  _   _ _     Troponin I, High Sensitivity   Date/Time Value Ref Range Status   12/13/2023 03:55 PM 3 0 - 20 ng/L Final   12/13/2023 02:27 PM 3 0 - 20 ng/L Final     BNP   Date/Time Value Ref Range Status   04/23/2021 10:27 AM 69 0 - 99 pg/mL Final     Comment:     .  <100 pg/mL - Heart failure unlikely  100-299 pg/mL - Intermediate probability of acute heart  .               failure exacerbation. Correlate with clinical  .               context and patient history.    >=300 pg/mL - Heart Failure likely. Correlate with clinical  .               context and patient history.  BNP testing is performed using different testing   methodology at [facility] than at other   system hospitals. Direct result comparisons should   only be made within the same method.     11/13/2020 04:00 PM 42 0 - 99 pg/mL Final     Comment:     .  <100 pg/mL - Heart failure unlikely  100-299 pg/mL - Intermediate probability of acute heart  .               failure exacerbation. Correlate with clinical  .               context and patient history.    >=300 pg/mL - Heart Failure likely. Correlate with clinical  .               context and patient history.  BNP testing is performed using different testing   methodology at [facility] than at other   system hospitals. Direct result comparisons should   only be made within the same method.       Hemoglobin A1C   Date/Time Value Ref Range Status   11/05/2020 01:21 PM 5.6 4.3 - 5.6 % Final     Comment:     American Diabetes Association guidelines indicate that patients with HgbA1c in   the range  5.7-6.4% are at increased risk for development of diabetes, and   intervention by lifestyle modification may be beneficial. HgbA1c greater or   equal to 6.5% is considered diagnostic of diabetes.     LDL Calculated   Date/Time Value Ref Range Status   12/13/2023 03:55 PM 55 <=99 mg/dL Final     Comment:                                 Near   Borderline      AGE      Desirable  Optimal    High     High     Very High     0-19 Y     0 - 109     ---    110-129   >/= 130     ----    20-24 Y     0 - 119     ---    120-159   >/= 160     ----      >24 Y     0 -  99   100-129  130-159   160-189     >/=190       VLDL   Date/Time Value Ref Range Status   12/13/2023 03:55 PM 19 0 - 40 mg/dL Final   02/10/2021 05:27 AM 38 0 - 40 mg/dL Final   10/15/2020 11:05 PM 43 (H) 0 - 40 mg/dL Final      Last I/O:  No intake/output data recorded.    Past Cardiology Tests (Last 3 Years):  EKG:  No results found for this or any previous visit from the past 1095 days.    Echo:  Transthoracic Echo (TTE) Complete 11/09/2023    Ejection Fractions:  EF   Date/Time Value Ref Range Status   11/09/2023 10:42 AM 49       Cath:  No results found for this or any previous visit from the past 1095 days.    Stress Test:  No results found for this or any previous visit from the past 1095 days.    Cardiac Imaging:  No results found for this or any previous visit from the past 1095 days.      Past Medical History:  He has no past medical history on file.    Past Surgical History:  He has no past surgical history on file.      Social History:  He reports that he has quit smoking. His smoking use included cigarettes. He smoked an average of 1.5 packs per day. He has never used smokeless tobacco. He reports that he does not currently use alcohol. He reports that he does not use drugs.    Family History:  Family History   Problem Relation Name Age of Onset    Other (cardiac disorder) Father      Hypertension Father          Allergies:  Ace inhibitors    Inpatient  Medications:  Scheduled medications   Medication Dose Route Frequency    alum-mag hydroxide-simeth  20 mL oral Once    [START ON 12/14/2023] amLODIPine  2.5 mg oral q AM    [START ON 12/14/2023] aspirin  81 mg oral q AM    busPIRone  30 mg oral BID    enoxaparin  40 mg subcutaneous q24h    [START ON 12/14/2023] ezetimibe  10 mg oral Daily    [START ON 12/14/2023] FLUoxetine  60 mg oral q AM    metoprolol tartrate  12.5 mg oral BID    [START ON 12/14/2023] pantoprazole  40 mg oral Daily    pantoprazole  40 mg intravenous Once    rosuvastatin  40 mg oral Nightly     PRN medications   Medication    albuterol    LORazepam    nitroglycerin     Continuous Medications   Medication Dose Last Rate     Outpatient Medications:  Current Outpatient Medications   Medication Instructions    acetaminophen (TYLENOL) 325-650 mg, oral, Every 4 hours PRN    albuterol 90 mcg/actuation inhaler 2 puffs, inhalation, Every 4 hours PRN, <BR>    amLODIPine (NORVASC) 2.5 mg, oral, Every morning    aspirin 81 mg, oral, Every morning    busPIRone (BUSPAR) 30 mg, oral, 2 times daily,  2 tab(s) orally once a day (in the evening)    cholecalciferol (VITAMIN D-3) 2,000 Units, oral, Every morning    esomeprazole (NEXIUM) 40 mg, oral, Daily PRN    ezetimibe (Zetia) 10 mg tablet 1 tablet, oral, Daily    FLUoxetine (PROZAC) 60 mg, oral, Every morning    fluticasone (Flonase) 50 mcg/actuation nasal spray 1 spray, Each Nostril, Daily PRN    furosemide (LASIX) 20 mg, oral, Daily PRN    LORazepam (ATIVAN) 1 mg, oral, Daily PRN    metoprolol tartrate (LOPRESSOR) 12.5 mg, oral, 2 times daily    multivitamin with minerals (multivit-min-iron fum-folic ac) tablet 1 tablet, oral, Daily    nitroglycerin (Nitrostat) 0.4 mg SL tablet  DISSOLVE 1 TABLET UNDER THE TONGUE AS NEEDED FOR CHEST PAIN- MAY REPEAT EVERY 5 MINUTES IF NEEDED ( MAX 3 DOSES.- IF NO RELIEF CALL 911)<BR>    pantoprazole (PROTONIX) 40 mg, oral, Daily RT    rosuvastatin (Crestor) 40 mg tablet 1  tablet, oral, Nightly       Physical Exam:  In general: alert and in no acute distress.   HEENT: Carotid upstrokes normal with no bruits. JVP is normal.   Pulmonary: Clear to auscultation bilaterally.  Cardiovascular: S1,S2, regular. No appreciable murmurs, rubs or gallops.   Lower extremities: Warm. 2+ distal pulses. No edema.     Assessment/Plan   1) chest pain: The patient was given the risk and benefits of catheterization including the risk of MI, CVA or death being less than 1: 1000.  He agreed and consented for the procedure.  Catheterization revealed no acute lesion.    At this point I believe his chest pain is noncardiac.  The vein graft occlusion is presumably old.  There are no acute lesions on catheterization.  I have spoken to the resident taking care of the patient on the medicine team.  I would advise further workup for noncardiac causes of chest pain.    I would start with a GI cocktail to see if that helps his symptoms.  If not we can consider more obscure etiologies.  He does have a history of ascending aortic aneurysm.  His blood pressure has not been elevated however aortic dissection could be a possible etiology.  We can start with a D-dimer and if that is markedly elevated then I would recommend CT angiogram of the chest.  If the D-dimer is not significantly elevated I think that that would effectively rule out dissection.    2) dyslipidemia: Continue statin therapy.    3) CAD: See above.  I do not believe that further intervention is warranted on the distal RCA.  He has been exercising routinely without exertional chest pain.  At this point I believe that his symptoms are noncardiac.    4) aortic valve replacement: No physical exam findings to suggest significant valvular disease.    5) will follow.  Peripheral IV 12/13/23 20 G Left Antecubital (Active)   Site Assessment Clean 12/13/23 1532   Dressing Type Transparent;Securing device 12/13/23 1532   Line Status Blood return noted;Flushed  12/13/23 1532   Dressing Status Clean 12/13/23 1532   Number of days: 0       Code Status:  Full Code    I spent 60 minutes in the professional and overall care of this patient.        Zach Gomez MD

## 2023-12-13 NOTE — POST-PROCEDURE NOTE
Physician Transition of Care Summary  Invasive Cardiovascular Lab    Procedure Date: 12/13/2023  Attending:    * Zach Gomez - Primary  Resident/Fellow/Other Assistant: Surgeon(s) and Role:    Indications:   Pre-op Diagnosis     * STEMI (ST elevation myocardial infarction) (CMS/HCC) [I21.3]    Post-procedure diagnosis:   Post-op Diagnosis     * STEMI (ST elevation myocardial infarction) (CMS/HCC) [I21.3]    Procedure(s):     * Left Heart Cath, No LV    * PCI      Procedure Findings:   Moderate disease of the left system.  Occluded RCA within previously deployed stent.  Faint collaterals to the distal RCA from the left system.  Could not cannulate or visualize the vein graft to the distal right.  Given the collaterals noted I presume that the vein graft has been occluded for some time and is not an acute issue.    Description of the Procedure:   Right radial, 6 Thai sheath.  15 mL of air placed in the TR band at the end of the procedure.    Complications:   None    Estimated Blood Loss:   10 mL    Anesthesia: Moderate Sedation Anesthesia Staff: No anesthesia staff entered.    Any Specimen(s) Removed:   No specimens collected during this procedure.    Disposition:   Will be transferred to CCU stepdown      Electronically signed by: Zach Gomez MD, 12/13/2023 6:10 PM

## 2023-12-13 NOTE — ED PROVIDER NOTES
HPI   Chief Complaint   Patient presents with    Chest Pain     Pt was on the treadmill at gym when he started to experience chest pressure; current 7/10 chest pain with radiation down L arm and up L shoulder. Hx of pacemaker, bypass, x8 cardiac stents, x1 MI hx.        58-year-old male presenting for exertional chest pain.  Endorses 7 out of 10 chest pressure that radiated to the neck and left arm.  History of CAD status post stenting, CABG, pacemaker.  History of hypertension, hyperlipidemia.  Still has chest pain at this time.  Not take anything prior to arrival.  Otherwise states has been normal state of health.  Denies any shortness of breath.  Does start that he got mildly lightheaded at the initial onset of the symptoms but is not in lightheaded at this time.  No focal weakness, numbness or tingling.  Denies any abdominal pain.  He is a former smoker.  Denies any peripheral edema or history of DVT/PE.  Does state that he takes 81 mg of aspirin daily.                          Keith Coma Scale Score: 15                  Patient History   History reviewed. No pertinent past medical history.  History reviewed. No pertinent surgical history.  Family History   Problem Relation Name Age of Onset    Other (cardiac disorder) Father      Hypertension Father       Social History     Tobacco Use    Smoking status: Former     Packs/day: 1.5     Types: Cigarettes    Smokeless tobacco: Never   Vaping Use    Vaping Use: Never used   Substance Use Topics    Alcohol use: Not Currently    Drug use: Never       Physical Exam   ED Triage Vitals   Temp Heart Rate Resp BP   12/13/23 1430 12/13/23 1430 12/13/23 1430 12/13/23 1430   36.7 °C (98.1 °F) 75 16 100/64      SpO2 Temp Source Heart Rate Source Patient Position   12/13/23 1430 12/13/23 1535 12/13/23 1535 12/13/23 1430   97 % Temporal Monitor Sitting      BP Location FiO2 (%)     12/13/23 1430 --     Right arm        Physical Exam  Vitals and nursing note reviewed.    Constitutional:       General: He is not in acute distress.     Appearance: He is well-developed.   HENT:      Head: Normocephalic and atraumatic.      Nose: No congestion or rhinorrhea.   Eyes:      Conjunctiva/sclera: Conjunctivae normal.   Cardiovascular:      Rate and Rhythm: Normal rate and regular rhythm.      Pulses: Normal pulses.      Heart sounds: No murmur heard.  Pulmonary:      Effort: Pulmonary effort is normal. No respiratory distress.      Breath sounds: Normal breath sounds. No stridor. No wheezing, rhonchi or rales.   Abdominal:      General: Bowel sounds are normal. There is no distension.      Palpations: Abdomen is soft.      Tenderness: There is no abdominal tenderness. There is no guarding or rebound.   Musculoskeletal:         General: No swelling.      Cervical back: Neck supple. No rigidity.      Right lower leg: No edema.      Left lower leg: No edema.   Skin:     General: Skin is warm and dry.      Capillary Refill: Capillary refill takes less than 2 seconds.      Findings: No rash.   Neurological:      Mental Status: He is alert.      Cranial Nerves: No cranial nerve deficit.      Sensory: No sensory deficit.      Motor: No weakness.      Gait: Gait normal.   Psychiatric:         Mood and Affect: Mood normal.         Behavior: Behavior normal.         Thought Content: Thought content normal.         ED Course & MDM   ED Course as of 12/13/23 2050   Wed Dec 13, 2023   1539 Chest Radiograph interpretation: No acute cardiopulmonary process.  No pneumothorax, widened mediastinum, pneumonia. [TL]   1619 Patient accepted by medicine team for admission at this time.  [TL]      ED Course User Index  [TL] Irwin Shoemaker DO         Diagnoses as of 12/13/23 2050   Chest pain, unspecified type       Medical Decision Making  58-year-old male present to the emerged part for evaluation of chest pain that began exertionally in nature.  Has not gone away since.  Has been ongoing for approximately 3  and half hours at the time my evaluation.  EKG immediately obtained which revealed an atrially paced rhythm with no sign of acute ischemia.  No ST elevation.  Initial troponin negative.  Patient has elevated heart score.  Will give aspirin 243 mg to get patient to therapeutic dose and nitro as needed with holding parameters.  I do not suspect pulmonary embolism at this time given patient is no tachycardia, sign of DVT on lower extremity and no hypoxia.  Given patient's elevated heart score plan for hospitalization at this time.  Call placed to medicine service.  Chest x-ray reveals no acute cardiopulmonary process although these were considered.  No sign of pneumonia, wide mediastinum to suggest aortic dissection and patient's not had any vomiting or retching to suggest concern for esophageal perforation.  Clear lungs bilaterally.  Patient has negative troponin and EKG that does not reveal any signs of ST elevation.  Given negative troponin greater than 3 hours after onset of chest pain decision made not to anticoagulate emergency room from the emergency permit.  Medicine team agrees to follow-up on the remaining troponin serial assay which have been ordered.        Procedure  Procedures     Irwin Shoemaker, DO  12/13/23 1552       Irwin Shoemaker, DO  12/13/23 3001

## 2023-12-14 ENCOUNTER — HOSPITAL ENCOUNTER (OUTPATIENT)
Dept: CARDIOLOGY | Facility: HOSPITAL | Age: 58
Discharge: HOME | DRG: 287 | End: 2023-12-14
Payer: OTHER GOVERNMENT

## 2023-12-14 VITALS
HEIGHT: 67 IN | SYSTOLIC BLOOD PRESSURE: 111 MMHG | OXYGEN SATURATION: 96 % | BODY MASS INDEX: 34.06 KG/M2 | HEART RATE: 67 BPM | WEIGHT: 217 LBS | TEMPERATURE: 96.8 F | DIASTOLIC BLOOD PRESSURE: 69 MMHG | RESPIRATION RATE: 16 BRPM

## 2023-12-14 PROBLEM — M12.9 ARTHROPATHY: Status: ACTIVE | Noted: 2023-12-14

## 2023-12-14 PROBLEM — I49.5 SINOATRIAL NODE DYSFUNCTION (MULTI): Status: ACTIVE | Noted: 2023-12-14

## 2023-12-14 PROBLEM — F43.10 POSTTRAUMATIC STRESS DISORDER: Status: ACTIVE | Noted: 2023-12-14

## 2023-12-14 PROBLEM — F33.9 MAJOR DEPRESSIVE DISORDER, RECURRENT, UNSPECIFIED (CMS-HCC): Status: ACTIVE | Noted: 2023-12-14

## 2023-12-14 PROBLEM — M22.40 CHONDROMALACIA OF PATELLA: Status: ACTIVE | Noted: 2023-12-14

## 2023-12-14 PROBLEM — Q23.81 BICUSPID AORTIC VALVE: Status: ACTIVE | Noted: 2023-12-14

## 2023-12-14 PROBLEM — I50.9 CONGESTIVE HEART FAILURE (MULTI): Status: ACTIVE | Noted: 2023-12-14

## 2023-12-14 PROBLEM — F32.A DEPRESSIVE DISORDER: Status: ACTIVE | Noted: 2023-12-14

## 2023-12-14 PROBLEM — F41.1 GENERALIZED ANXIETY DISORDER: Status: ACTIVE | Noted: 2018-10-03

## 2023-12-14 PROBLEM — G47.30 SLEEP APNEA: Status: ACTIVE | Noted: 2023-12-14

## 2023-12-14 PROBLEM — Z95.5 S/P DRUG ELUTING CORONARY STENT PLACEMENT: Status: ACTIVE | Noted: 2017-10-23

## 2023-12-14 PROBLEM — F33.1 MAJOR DEPRESSIVE DISORDER, RECURRENT, MODERATE (MULTI): Status: ACTIVE | Noted: 2023-12-14

## 2023-12-14 PROBLEM — I35.0 NONRHEUMATIC AORTIC VALVE STENOSIS: Status: ACTIVE | Noted: 2023-12-14

## 2023-12-14 PROBLEM — K44.9 DIAPHRAGMATIC HERNIA: Status: ACTIVE | Noted: 2023-12-14

## 2023-12-14 PROBLEM — E66.9 OBESITY: Status: ACTIVE | Noted: 2018-10-04

## 2023-12-14 PROBLEM — E55.9 VITAMIN D DEFICIENCY: Status: ACTIVE | Noted: 2023-12-14

## 2023-12-14 PROBLEM — F17.200 NICOTINE DEPENDENCE: Status: ACTIVE | Noted: 2023-12-14

## 2023-12-14 PROBLEM — M76.899 PES ANSERINUS TENDINITIS AND BURSITIS: Status: ACTIVE | Noted: 2023-12-14

## 2023-12-14 PROBLEM — Z95.2 HISTORY OF AORTIC VALVE REPLACEMENT: Status: ACTIVE | Noted: 2023-12-14

## 2023-12-14 PROBLEM — Q23.1 BICUSPID AORTIC VALVE (HHS-HCC): Status: ACTIVE | Noted: 2023-12-14

## 2023-12-14 LAB
ALBUMIN SERPL BCP-MCNC: 3.8 G/DL (ref 3.4–5)
ANION GAP SERPL CALC-SCNC: 9 MMOL/L (ref 10–20)
BUN SERPL-MCNC: 14 MG/DL (ref 6–23)
CALCIUM SERPL-MCNC: 8.5 MG/DL (ref 8.6–10.3)
CHLORIDE SERPL-SCNC: 105 MMOL/L (ref 98–107)
CO2 SERPL-SCNC: 27 MMOL/L (ref 21–32)
CREAT SERPL-MCNC: 0.8 MG/DL (ref 0.5–1.3)
ERYTHROCYTE [DISTWIDTH] IN BLOOD BY AUTOMATED COUNT: 13.2 % (ref 11.5–14.5)
GFR SERPL CREATININE-BSD FRML MDRD: >90 ML/MIN/1.73M*2
GLUCOSE SERPL-MCNC: 95 MG/DL (ref 74–99)
HCT VFR BLD AUTO: 44.5 % (ref 41–52)
HGB BLD-MCNC: 14.4 G/DL (ref 13.5–17.5)
MAGNESIUM SERPL-MCNC: 1.83 MG/DL (ref 1.6–2.4)
MCH RBC QN AUTO: 30.5 PG (ref 26–34)
MCHC RBC AUTO-ENTMCNC: 32.4 G/DL (ref 32–36)
MCV RBC AUTO: 94 FL (ref 80–100)
NRBC BLD-RTO: 0 /100 WBCS (ref 0–0)
PHOSPHATE SERPL-MCNC: 2.8 MG/DL (ref 2.5–4.9)
PLATELET # BLD AUTO: 238 X10*3/UL (ref 150–450)
POTASSIUM SERPL-SCNC: 3.7 MMOL/L (ref 3.5–5.3)
RBC # BLD AUTO: 4.72 X10*6/UL (ref 4.5–5.9)
SODIUM SERPL-SCNC: 137 MMOL/L (ref 136–145)
WBC # BLD AUTO: 7.3 X10*3/UL (ref 4.4–11.3)

## 2023-12-14 PROCEDURE — 2500000001 HC RX 250 WO HCPCS SELF ADMINISTERED DRUGS (ALT 637 FOR MEDICARE OP): Performed by: STUDENT IN AN ORGANIZED HEALTH CARE EDUCATION/TRAINING PROGRAM

## 2023-12-14 PROCEDURE — 85027 COMPLETE CBC AUTOMATED: CPT | Performed by: STUDENT IN AN ORGANIZED HEALTH CARE EDUCATION/TRAINING PROGRAM

## 2023-12-14 PROCEDURE — 36415 COLL VENOUS BLD VENIPUNCTURE: CPT | Performed by: STUDENT IN AN ORGANIZED HEALTH CARE EDUCATION/TRAINING PROGRAM

## 2023-12-14 PROCEDURE — 80069 RENAL FUNCTION PANEL: CPT | Performed by: STUDENT IN AN ORGANIZED HEALTH CARE EDUCATION/TRAINING PROGRAM

## 2023-12-14 PROCEDURE — 93005 ELECTROCARDIOGRAM TRACING: CPT

## 2023-12-14 PROCEDURE — 83735 ASSAY OF MAGNESIUM: CPT | Performed by: STUDENT IN AN ORGANIZED HEALTH CARE EDUCATION/TRAINING PROGRAM

## 2023-12-14 PROCEDURE — 2500000002 HC RX 250 W HCPCS SELF ADMINISTERED DRUGS (ALT 637 FOR MEDICARE OP, ALT 636 FOR OP/ED): Performed by: STUDENT IN AN ORGANIZED HEALTH CARE EDUCATION/TRAINING PROGRAM

## 2023-12-14 PROCEDURE — 2500000004 HC RX 250 GENERAL PHARMACY W/ HCPCS (ALT 636 FOR OP/ED): Performed by: STUDENT IN AN ORGANIZED HEALTH CARE EDUCATION/TRAINING PROGRAM

## 2023-12-14 RX ORDER — ACETAMINOPHEN 325 MG/1
650 TABLET ORAL EVERY 6 HOURS PRN
Status: DISCONTINUED | OUTPATIENT
Start: 2023-12-14 | End: 2023-12-14 | Stop reason: HOSPADM

## 2023-12-14 RX ADMIN — AMLODIPINE BESYLATE 2.5 MG: 2.5 TABLET ORAL at 09:04

## 2023-12-14 RX ADMIN — ASPIRIN 81 MG CHEWABLE TABLET 81 MG: 81 TABLET CHEWABLE at 09:03

## 2023-12-14 RX ADMIN — ACETAMINOPHEN 650 MG: 325 TABLET ORAL at 12:00

## 2023-12-14 RX ADMIN — METOPROLOL TARTRATE 12.5 MG: 25 TABLET, FILM COATED ORAL at 09:03

## 2023-12-14 RX ADMIN — PANTOPRAZOLE SODIUM 40 MG: 40 TABLET, DELAYED RELEASE ORAL at 09:03

## 2023-12-14 RX ADMIN — FLUOXETINE 60 MG: 20 CAPSULE ORAL at 09:03

## 2023-12-14 RX ADMIN — EZETIMIBE 10 MG: 10 TABLET ORAL at 09:04

## 2023-12-14 RX ADMIN — BUSPIRONE HYDROCHLORIDE 30 MG: 15 TABLET ORAL at 09:04

## 2023-12-14 ASSESSMENT — PAIN - FUNCTIONAL ASSESSMENT
PAIN_FUNCTIONAL_ASSESSMENT: 0-10

## 2023-12-14 ASSESSMENT — COGNITIVE AND FUNCTIONAL STATUS - GENERAL
DAILY ACTIVITIY SCORE: 24
MOBILITY SCORE: 24

## 2023-12-14 ASSESSMENT — PAIN DESCRIPTION - DESCRIPTORS
DESCRIPTORS: ACHING;PRESSURE
DESCRIPTORS: ACHING

## 2023-12-14 ASSESSMENT — PAIN SCALES - GENERAL
PAINLEVEL_OUTOF10: 3
PAINLEVEL_OUTOF10: 6
PAINLEVEL_OUTOF10: 0 - NO PAIN
PAINLEVEL_OUTOF10: 0 - NO PAIN

## 2023-12-14 ASSESSMENT — ACTIVITIES OF DAILY LIVING (ADL): LACK_OF_TRANSPORTATION: NO

## 2023-12-14 NOTE — DISCHARGE SUMMARY
Discharge Diagnosis  Chest pain, unspecified type    Issues Requiring Follow-Up  FU with PCP and Cardiologist     Discharge Meds     Your medication list        CONTINUE taking these medications        Instructions Last Dose Given Next Dose Due   acetaminophen 325 mg tablet  Commonly known as: Tylenol           albuterol 90 mcg/actuation inhaler           amLODIPine 2.5 mg tablet  Commonly known as: Norvasc           aspirin 81 mg chewable tablet           busPIRone 15 mg tablet  Commonly known as: Buspar           cholecalciferol 50 mcg (2,000 unit) capsule  Commonly known as: Vitamin D-3           esomeprazole 40 mg DR capsule  Commonly known as: NexIUM           ezetimibe 10 mg tablet  Commonly known as: Zetia           FLUoxetine 20 mg capsule  Commonly known as: PROzac           fluticasone 50 mcg/actuation nasal spray  Commonly known as: Flonase           furosemide 20 mg tablet  Commonly known as: Lasix           LORazepam 1 mg tablet  Commonly known as: Ativan           metoprolol tartrate 25 mg tablet  Commonly known as: Lopressor           multivitamin with minerals tablet           nitroglycerin 0.4 mg SL tablet  Commonly known as: Nitrostat           pantoprazole 40 mg EC tablet  Commonly known as: ProtoNix           rosuvastatin 40 mg tablet  Commonly known as: Crestor                    Test Results Pending At Discharge  Pending Labs       No current pending labs.            Hospital Course   Presented after experiencing chest pain while exercising on the treadmill.  Chest pain was unremitting despite sublingual nitroglycerin.  In the emergency labs and EKG showed no evidence of acute ischemic changes but history concerning and was taken to Cath Lab which revealed an occluded RCA with faint collaterals from the left system to the distal RCA.  The cardiologist could not find the vein graft to the RCA and was presumed to be occluded.  Overnight his chest pain has subsided and not present on exertion.  To  be discharged home with no change to his home medication request to follow-up with his PCP and cardiologist for hospital follow-up    Pertinent Physical Exam At Time of Discharge  Physical Exam  Constitutional:       Appearance: Normal appearance.   HENT:      Head: Normocephalic.   Cardiovascular:      Rate and Rhythm: Normal rate and regular rhythm.      Heart sounds: Normal heart sounds. No murmur heard.  Pulmonary:      Effort: No respiratory distress.      Breath sounds: Normal breath sounds. No wheezing, rhonchi or rales.   Abdominal:      Palpations: Abdomen is soft.      Tenderness: There is no abdominal tenderness. There is no guarding.   Musculoskeletal:         General: No swelling or tenderness.      Right lower leg: No edema.      Left lower leg: No edema.   Skin:     General: Skin is dry.      Capillary Refill: Capillary refill takes less than 2 seconds.   Neurological:      General: No focal deficit present.      Mental Status: He is alert and oriented to person, place, and time.   Psychiatric:         Mood and Affect: Mood normal.         Outpatient Follow-Up  Future Appointments   Date Time Provider Department Center   5/10/2024  9:00 AM Leroy Delgado MD [id number] [place]         Leroy Perdomo MD

## 2023-12-14 NOTE — PROGRESS NOTES
Care Transition  Met with patient to confirm information. Lives with spouse Fifi Connors. Retired . PCP  PRATIBHA Alva. Follows at [facility] and has medications mailed to home. Presented with chest pain. Had cardiac cath. Discharge plan to home today.       Lizzette Bhakta RN

## 2023-12-14 NOTE — NURSING NOTE
Report received via telephone from cath lab nurse.  Pt then brought to room 2102 with 2 cath lab nurses.  Right radial tr band intact, no bleeding/henatoma noted.  Informed of 15ml air intact.  Pt denies chest pain or sob.  Oriented to room and unit.  Call light in reach.

## 2023-12-14 NOTE — CARE PLAN
The patient's goals for the shift include  no c/p    The clinical goals for the shift include no c/p    Over the shift, the patient did not make progress toward the following goals. Barriers to progression include . Recommendations to address these barriers include .

## 2023-12-14 NOTE — CARE PLAN
Problem: Cardiac catheterization  Goal: Free from dysrhythmias  Outcome: Progressing  Goal: Free from pain  Outcome: Progressing  Goal: No evidence of post procedure complications  Outcome: Progressing  Goal: Promote self management  Outcome: Progressing  Goal: Verbalize understanding of procedure  Outcome: Progressing  Goal: Care and maintenance of device (specify)  Outcome: Progressing     Problem: Cardiac catheterization  Goal: Free from pain  Outcome: Progressing     Problem: Cardiac catheterization  Goal: No evidence of post procedure complications  Outcome: Progressing     Problem: Cardiac catheterization  Goal: Promote self management  Outcome: Progressing     Problem: Cardiac catheterization  Goal: Verbalize understanding of procedure  Outcome: Progressing     Problem: Cardiac catheterization  Goal: Care and maintenance of device (specify)  Outcome: Progressing   The patient's goals for the shift include      The clinical goals for the shift include no c/p

## 2023-12-15 LAB
ATRIAL RATE: 70 BPM
ATRIAL RATE: 71 BPM
P AXIS: 57 DEGREES
P OFFSET: 195 MS
P OFFSET: 198 MS
P ONSET: 137 MS
P ONSET: 147 MS
PR INTERVAL: 152 MS
PR INTERVAL: 162 MS
Q ONSET: 218 MS
Q ONSET: 223 MS
QRS COUNT: 12 BEATS
QRS COUNT: 12 BEATS
QRS DURATION: 80 MS
QRS DURATION: 88 MS
QT INTERVAL: 414 MS
QT INTERVAL: 414 MS
QTC CALCULATION(BAZETT): 447 MS
QTC CALCULATION(BAZETT): 449 MS
QTC FREDERICIA: 436 MS
QTC FREDERICIA: 438 MS
R AXIS: 69 DEGREES
R AXIS: 76 DEGREES
T AXIS: 74 DEGREES
T AXIS: 76 DEGREES
T OFFSET: 425 MS
T OFFSET: 430 MS
VENTRICULAR RATE: 70 BPM
VENTRICULAR RATE: 71 BPM

## 2024-01-02 LAB
ATRIAL RATE: 78 BPM
P AXIS: 82 DEGREES
P OFFSET: 197 MS
P ONSET: 144 MS
PR INTERVAL: 146 MS
Q ONSET: 219 MS
QRS COUNT: 13 BEATS
QRS DURATION: 86 MS
QT INTERVAL: 390 MS
QTC CALCULATION(BAZETT): 444 MS
QTC FREDERICIA: 425 MS
R AXIS: 95 DEGREES
T AXIS: 54 DEGREES
T OFFSET: 414 MS
VENTRICULAR RATE: 78 BPM

## 2024-01-25 ENCOUNTER — HOSPITAL ENCOUNTER (EMERGENCY)
Facility: HOSPITAL | Age: 59
Discharge: HOME | End: 2024-01-25
Attending: STUDENT IN AN ORGANIZED HEALTH CARE EDUCATION/TRAINING PROGRAM
Payer: OTHER GOVERNMENT

## 2024-01-25 ENCOUNTER — APPOINTMENT (OUTPATIENT)
Dept: RADIOLOGY | Facility: HOSPITAL | Age: 59
End: 2024-01-25
Payer: OTHER GOVERNMENT

## 2024-01-25 VITALS
WEIGHT: 217 LBS | BODY MASS INDEX: 34.06 KG/M2 | DIASTOLIC BLOOD PRESSURE: 72 MMHG | HEIGHT: 67 IN | TEMPERATURE: 97.9 F | HEART RATE: 73 BPM | SYSTOLIC BLOOD PRESSURE: 116 MMHG | OXYGEN SATURATION: 99 % | RESPIRATION RATE: 18 BRPM

## 2024-01-25 DIAGNOSIS — S69.92XA HAND INJURY, LEFT, INITIAL ENCOUNTER: Primary | ICD-10-CM

## 2024-01-25 PROCEDURE — 99284 EMERGENCY DEPT VISIT MOD MDM: CPT | Performed by: STUDENT IN AN ORGANIZED HEALTH CARE EDUCATION/TRAINING PROGRAM

## 2024-01-25 PROCEDURE — 73130 X-RAY EXAM OF HAND: CPT | Mod: LEFT SIDE | Performed by: RADIOLOGY

## 2024-01-25 PROCEDURE — 2500000005 HC RX 250 GENERAL PHARMACY W/O HCPCS: Performed by: EMERGENCY MEDICINE

## 2024-01-25 PROCEDURE — 73130 X-RAY EXAM OF HAND: CPT | Mod: LT

## 2024-01-25 PROCEDURE — 99283 EMERGENCY DEPT VISIT LOW MDM: CPT | Performed by: STUDENT IN AN ORGANIZED HEALTH CARE EDUCATION/TRAINING PROGRAM

## 2024-01-25 RX ORDER — ACETAMINOPHEN 325 MG/1
975 TABLET ORAL ONCE
Status: COMPLETED | OUTPATIENT
Start: 2024-01-25 | End: 2024-01-25

## 2024-01-25 RX ORDER — LIDOCAINE 560 MG/1
1 PATCH PERCUTANEOUS; TOPICAL; TRANSDERMAL DAILY
Qty: 14 PATCH | Refills: 0 | Status: SHIPPED | OUTPATIENT
Start: 2024-01-25 | End: 2024-05-10 | Stop reason: WASHOUT

## 2024-01-25 RX ORDER — LIDOCAINE 560 MG/1
1 PATCH PERCUTANEOUS; TOPICAL; TRANSDERMAL DAILY
Status: DISCONTINUED | OUTPATIENT
Start: 2024-01-25 | End: 2024-01-25 | Stop reason: HOSPADM

## 2024-01-25 RX ADMIN — LIDOCAINE 1 PATCH: 4 PATCH TOPICAL at 15:05

## 2024-01-25 RX ADMIN — ACETAMINOPHEN 975 MG: 325 TABLET ORAL at 15:04

## 2024-01-25 ASSESSMENT — LIFESTYLE VARIABLES
EVER HAD A DRINK FIRST THING IN THE MORNING TO STEADY YOUR NERVES TO GET RID OF A HANGOVER: NO
HAVE PEOPLE ANNOYED YOU BY CRITICIZING YOUR DRINKING: NO
EVER FELT BAD OR GUILTY ABOUT YOUR DRINKING: NO
REASON UNABLE TO ASSESS: NO
HAVE YOU EVER FELT YOU SHOULD CUT DOWN ON YOUR DRINKING: NO

## 2024-01-25 ASSESSMENT — PAIN - FUNCTIONAL ASSESSMENT: PAIN_FUNCTIONAL_ASSESSMENT: 0-10

## 2024-01-25 ASSESSMENT — COLUMBIA-SUICIDE SEVERITY RATING SCALE - C-SSRS
1. IN THE PAST MONTH, HAVE YOU WISHED YOU WERE DEAD OR WISHED YOU COULD GO TO SLEEP AND NOT WAKE UP?: NO
2. HAVE YOU ACTUALLY HAD ANY THOUGHTS OF KILLING YOURSELF?: NO
6. HAVE YOU EVER DONE ANYTHING, STARTED TO DO ANYTHING, OR PREPARED TO DO ANYTHING TO END YOUR LIFE?: NO

## 2024-01-25 ASSESSMENT — PAIN DESCRIPTION - ORIENTATION: ORIENTATION: LEFT

## 2024-01-25 ASSESSMENT — PAIN DESCRIPTION - LOCATION: LOCATION: WRIST

## 2024-01-25 ASSESSMENT — PAIN SCALES - GENERAL: PAINLEVEL_OUTOF10: 3

## 2024-01-25 NOTE — DISCHARGE INSTRUCTIONS
Please return to the ER or seek immediate medical attention if you experience Increasing pain, weakness, loss of motion, numbness, tingling, pain out of proportion to light touch, significant temperature or color difference between sick limb and normal limb,  or worsening of your current condition despite current medical management plan.    You are welcome back any time. Thank you for entrusting your care to us, I hope we made your visit as pleasant as possible. Wishing you well!    Dr. Fontanez

## 2024-01-30 ENCOUNTER — OFFICE VISIT (OUTPATIENT)
Dept: ORTHOPEDIC SURGERY | Facility: CLINIC | Age: 59
End: 2024-01-30
Payer: OTHER GOVERNMENT

## 2024-01-30 DIAGNOSIS — M79.642 LEFT HAND PAIN: ICD-10-CM

## 2024-01-30 PROCEDURE — 20600 DRAIN/INJ JOINT/BURSA W/O US: CPT | Performed by: STUDENT IN AN ORGANIZED HEALTH CARE EDUCATION/TRAINING PROGRAM

## 2024-01-30 PROCEDURE — 99214 OFFICE O/P EST MOD 30 MIN: CPT | Performed by: STUDENT IN AN ORGANIZED HEALTH CARE EDUCATION/TRAINING PROGRAM

## 2024-01-30 PROCEDURE — L3924 HFO WITHOUT JOINTS PRE OTS: HCPCS | Performed by: STUDENT IN AN ORGANIZED HEALTH CARE EDUCATION/TRAINING PROGRAM

## 2024-01-30 PROCEDURE — 1036F TOBACCO NON-USER: CPT | Performed by: STUDENT IN AN ORGANIZED HEALTH CARE EDUCATION/TRAINING PROGRAM

## 2024-01-30 RX ORDER — MELOXICAM 15 MG/1
15 TABLET ORAL DAILY
Qty: 30 TABLET | Refills: 0 | Status: SHIPPED | OUTPATIENT
Start: 2024-01-30 | End: 2024-02-29

## 2024-01-30 RX ORDER — LIDOCAINE HYDROCHLORIDE 10 MG/ML
0.5 INJECTION INFILTRATION; PERINEURAL
Status: COMPLETED | OUTPATIENT
Start: 2024-01-30 | End: 2024-01-30

## 2024-01-30 RX ADMIN — LIDOCAINE HYDROCHLORIDE 0.5 ML: 10 INJECTION INFILTRATION; PERINEURAL at 14:25

## 2024-01-30 NOTE — PROGRESS NOTES
History of Present Illness:  Presents for evaluation of left hand.  A propane tank fell on the left thumb CMC and dorsum of his hand.  The pain is localized about the base of the thumb. It is described as moderate. The pain occurs intermittantly and is worse with pinching and gripping. The patient presents due to persistent symptoms even with activity modification.    Prior to this he was not having pain.  X-ray obtained on 25th and shows no acute osseous finding.    Review of Systems   GENERAL: Negative for malaise, significant weight loss, fever  MUSCULOSKELETAL: see HPI  NEURO:  Negative    The patient's past medical history, family history, social history, and review of systems were reviewed. History is otherwise negative except as stated in the HPI.    Physical Examination:  General: Alert and oriented to person, place, and time.  No acute distress and breathing comfortably: Pleasant and cooperative with examination.  HEENT: Head is normocephalic and atraumatic.  Neck: Supple, no visible swelling.  Cardiovascular: No palpable tachycardia  Lungs: No audible wheezing or labored breathing  Abdomen: Nondistended.  On musculoskeletal examination, the patient has full elbow range of motion. In regards to the wrist, there is no obvious deformity. Range of motion is full in flexion, extension, pronation, and supination. Strength is 5/5 in flexion and extension. There is tenderness to palpation of the thumb CMC joint. Provocative testing with CMC grind is positive with crepitus. The thumb MP joint demonstrates no hyperextension instability. There is no tenderness to palpation about the 1st dorsal compartment, the SL interval, or the TFCC. Sensation and motor function are intact in the radial, ulnar, and median nerve distribution. There is no obvious thenar or intrinsic atrophy. All fingers are without triggering and are without pain over the A1 pulley. The patient can make a full composite fist. The hand itself is  warm and well perfused. The skin is intact throughout. The contralateral hand and wrist are normal to inspection, range of motion, stability, and strength.    Imaging:  AP, lateral, and oblique radiographs of the left hand were reviewed. These reveal thumb CMC arthritis.    S Inj/Asp: L thumb CMC on 1/30/2024 2:25 PM  Indications: pain  Details: 24 G needle, plantar approach  Medications: 5 mg triamcinolone acetonide 10 mg/mL; 0.5 mL lidocaine 10 mg/mL (1 %)  Procedure, treatment alternatives, risks and benefits explained, specific risks discussed. Consent was given by the patient. Immediately prior to procedure a time out was called to verify the correct patient, procedure, equipment, support staff and site/side marked as required. Patient was prepped and draped in the usual sterile fashion.             Assessment:  Patient with left cmc arthritis.     Plan:  Left thumb Injection. I had a long discussion with the patient regarding the diagnosis of thumb CMC arthritis and the risks/benefits/expected outcomes of various treatment options. At this point, the patient elected non-operative treatment consisting of activity modification, intermittant NSAIDs (if not medically contraindicated), and/or thumb spica splinting. I also discussed the option of a corticosteroid injection. Specifically, I reviewed the risks of injection which include, but are not limited to, infection, bleeding, pain, steroid flare, glycemic alteration, subcutaneous fat atrophy, skin hypopigmentation, soft tissue damage, and incomplete symptom relief. The patient consented to the injection, and then using sterile technique, I injected a 1mL of Kenalog 40 into the thumb CMC joint. The injection site was dressed, and the patient tolerated the injection well. Finally, I have emphasized patience, as any benefit may take some time to manifest. Depending on the success of this non-operative course, I will see them back on an as needed basis.      Follow  up:  3 months      Danyell Chavez MD  Orthopaedic Surgeon

## 2024-04-30 ENCOUNTER — OFFICE VISIT (OUTPATIENT)
Dept: ORTHOPEDIC SURGERY | Facility: CLINIC | Age: 59
End: 2024-04-30
Payer: OTHER GOVERNMENT

## 2024-04-30 DIAGNOSIS — M18.0 ARTHRITIS OF CARPOMETACARPAL (CMC) JOINT OF BOTH THUMBS: Primary | ICD-10-CM

## 2024-04-30 PROCEDURE — 1036F TOBACCO NON-USER: CPT | Performed by: STUDENT IN AN ORGANIZED HEALTH CARE EDUCATION/TRAINING PROGRAM

## 2024-04-30 PROCEDURE — 99214 OFFICE O/P EST MOD 30 MIN: CPT | Performed by: STUDENT IN AN ORGANIZED HEALTH CARE EDUCATION/TRAINING PROGRAM

## 2024-04-30 PROCEDURE — 20600 DRAIN/INJ JOINT/BURSA W/O US: CPT | Performed by: STUDENT IN AN ORGANIZED HEALTH CARE EDUCATION/TRAINING PROGRAM

## 2024-04-30 RX ORDER — LIDOCAINE HYDROCHLORIDE 10 MG/ML
0.5 INJECTION INFILTRATION; PERINEURAL
Status: COMPLETED | OUTPATIENT
Start: 2024-04-30 | End: 2024-04-30

## 2024-04-30 RX ADMIN — LIDOCAINE HYDROCHLORIDE 0.5 ML: 10 INJECTION INFILTRATION; PERINEURAL at 10:42

## 2024-04-30 NOTE — PROGRESS NOTES
History of Present Illness  Patient returns today for evaluation of left thumb CMC.  Last injection 3 months ago with complete resolution of pain until recently.  Now with bilateral symptoms.  Interested in bilateral thumb CMC injections.     Physical Examination:  Bilateral:  The patient appears to be their stated age, is in no apparent distress, and is oriented x3. The patients mood and affect are appropriate. The patients gait is normal. The examination of the limb in question was performed in comparison to the contralateral limb.    On musculoskeletal examination, tenderness over bilateral thumb CMC.  Positive grind    Sensation and motor function are intact in the radial, and median nerve distribution. There is no obvious thenar atrophy, and thenar strength is 5/5. There is no intrinsic atrophy, and intrinsic strength is 5/5.  The patient can make a full composite fist. The hand itself is warm and well perfused. The skin is intact throughout. The contralateral hand/wrist are normal to inspection, range of motion, stability, and strength.    S Inj/Asp: bilateral thumb CMC on 4/30/2024 10:42 AM  Indications: pain  Details: 24 G needle, plantar approach  Medications (Right): 5 mg triamcinolone acetonide 10 mg/mL; 0.5 mL lidocaine 10 mg/mL (1 %)  Medications (Left): 5 mg triamcinolone acetonide 10 mg/mL; 0.5 mL lidocaine 10 mg/mL (1 %)  Outcome: tolerated well, no immediate complications  Procedure, treatment alternatives, risks and benefits explained, specific risks discussed. Consent was given by the patient. Immediately prior to procedure a time out was called to verify the correct patient, procedure, equipment, support staff and site/side marked as required. Patient was prepped and draped in the usual sterile fashion.             Assessment:  Patient with bilateral thumb CMC arthritis.  Interested in repeat cortisone injections today.    Plan:  Injections.  I explained the risks and benefits of an  injection. Specifically, I reviewed the risks of injection, which include, but are not limited to, infection, bleeding, nerve injury, pain, steroid flare, glycemic alteration, subcutaneous fat atrophy, skin hypopigmentation, soft tissue damage, and incomplete symptom relief. At this time, the patient would like to proceed with an injection. After obtaining consent, I injected a 1mL combination of Kenalog and 1% lidocaine into bilateral thumb cmc, sterile technique. The injection site was dressed, and the patient tolerated the injection well. I am hopeful that this injection will serve diagnostic, prognostic, and therapeutic purposes. Finally, I have emphasized patience, as any benefit may take several weeks to manifest. Depending on the success of the injection, I will see them back 3 months     Follow-up in 3 months.  Would like right hand x-ray at follow-up.    Danyell Chavez MD

## 2024-05-10 ENCOUNTER — OFFICE VISIT (OUTPATIENT)
Dept: CARDIOLOGY | Facility: CLINIC | Age: 59
End: 2024-05-10
Payer: OTHER GOVERNMENT

## 2024-05-10 ENCOUNTER — ANCILLARY PROCEDURE (OUTPATIENT)
Dept: CARDIOLOGY | Facility: CLINIC | Age: 59
End: 2024-05-10
Payer: OTHER GOVERNMENT

## 2024-05-10 VITALS
SYSTOLIC BLOOD PRESSURE: 110 MMHG | WEIGHT: 226.4 LBS | HEART RATE: 93 BPM | HEIGHT: 67 IN | DIASTOLIC BLOOD PRESSURE: 86 MMHG | BODY MASS INDEX: 35.53 KG/M2

## 2024-05-10 DIAGNOSIS — Z95.0 CARDIAC PACEMAKER: ICD-10-CM

## 2024-05-10 DIAGNOSIS — Z95.0 CARDIAC PACEMAKER: Primary | ICD-10-CM

## 2024-05-10 PROCEDURE — 93279 PRGRMG DEV EVAL PM/LDLS PM: CPT | Performed by: INTERNAL MEDICINE

## 2024-05-10 PROCEDURE — 3079F DIAST BP 80-89 MM HG: CPT | Performed by: INTERNAL MEDICINE

## 2024-05-10 PROCEDURE — 3074F SYST BP LT 130 MM HG: CPT | Performed by: INTERNAL MEDICINE

## 2024-05-10 PROCEDURE — 99213 OFFICE O/P EST LOW 20 MIN: CPT | Performed by: INTERNAL MEDICINE

## 2024-05-10 PROCEDURE — 1036F TOBACCO NON-USER: CPT | Performed by: INTERNAL MEDICINE

## 2024-05-10 NOTE — PROGRESS NOTES
Subjective:  The patient is a 59-year-old white male, followed primarily by Dr. Louie Alva and from a cardiology standpoint by Dr. Zach Gomez, who presents today for pacemaker follow-up.  He has a history of sinus node dysfunction of uncertain etiology, Dr. Perhaps represent an adverse reaction to smallpox vaccine.  He underwent Medtronic AAIR pacemaker placement in April 2006, with a generator replacement in January 2016 (at the [facility]).  His device function has been normal, and providing AAIR pacing a high percentage of the time, though it is listed as VVIR pacing on the report, since AAIR was not an option.    The patient also has a history of hypertension, hyperlipidemia, and a thoracic aortic aneurysm.  He underwent bioprosthetic aortic valve replacement and aortic root repair by Dr. Merlin Brewster in December 2020 at [facility], with single-vessel CABG (saphenous vein graft to posterior descending artery).    The patient is retired from a career  post with the [employer] and then the [employer].  He is  and lives at home with his wife in [place].  He denies lightheadedness, syncope, or palpitations.    Current Outpatient Medications   Medication Sig    acetaminophen (Tylenol) 325 mg tablet Take 1-2 tablets (325-650 mg) by mouth every 4 hours if needed.    albuterol 90 mcg/actuation inhaler Inhale 2 puffs every 4 hours if needed for wheezing.    amLODIPine (Norvasc) 2.5 mg tablet Take 1 tablet (2.5 mg) by mouth once daily in the morning.    aspirin 81 mg chewable tablet Chew 1 tablet (81 mg) once daily in the morning.    busPIRone (Buspar) 15 mg tablet Take 2 tablets (30 mg) by mouth 2 times a day.    cholecalciferol (Vitamin D-3) 50 mcg (2,000 unit) capsule Take 1 capsule (50 mcg) by mouth once daily in the morning.    esomeprazole (NexIUM) 40 mg DR capsule Take 1 capsule (40 mg) by mouth once daily as needed (heartburn).    ezetimibe (Zetia) 10 mg tablet Take  1 tablet (10 mg) by mouth once daily.    FLUoxetine (PROzac) 20 mg capsule Take 3 capsules (60 mg) by mouth once daily in the morning.    fluticasone (Flonase) 50 mcg/actuation nasal spray Administer 1 spray into each nostril once daily as needed for allergies.    furosemide (Lasix) 20 mg tablet Take 1 tablet (20 mg) by mouth once daily as needed.    LORazepam (Ativan) 1 mg tablet Take 1 tablet (1 mg) by mouth once daily as needed for anxiety.    metoprolol tartrate (Lopressor) 25 mg tablet Take 0.5 tablets (12.5 mg) by mouth 2 times a day.    multivitamin with minerals (multivit-min-iron fum-folic ac) tablet Take 1 tablet by mouth once daily.    nitroglycerin (Nitrostat) 0.4 mg SL tablet DISSOLVE 1 TABLET UNDER THE TONGUE AS NEEDED FOR CHEST PAIN- MAY REPEAT EVERY 5 MINUTES IF NEEDED ( MAX 3 DOSES.- IF NO RELIEF CALL 911)    pantoprazole (ProtoNix) 40 mg EC tablet Take 1 tablet (40 mg) by mouth once daily.    rosuvastatin (Crestor) 40 mg tablet Take 1 tablet (40 mg) by mouth once daily at bedtime.     Allergies:  Ace inhibitors     Patient Active Problem List   Diagnosis    Bilateral lower extremity edema    CAD (coronary artery disease)    Dyslipidemia    Thoracic aortic aneurysm without rupture (CMS-HCC)    Cardiac pacemaker    Chest pain, unspecified type    Arthropathy    Bicuspid aortic valve (HHS-HCC)    Cerebral cysts    Chondromalacia of patella    Congestive heart failure (Multi)    Crohn disease (Multi)    Depressive disorder    Diaphragmatic hernia    Esophageal reflux    Essential hypertension    Generalized anxiety disorder    Rotator cuff (capsule) sprain    Hyperlipidemia    Intercostal pain    Major depressive disorder, recurrent, moderate (Multi)    Major depressive disorder, recurrent, unspecified (CMS-HCC)    Posttraumatic stress disorder    Nicotine dependence    Nonrheumatic aortic valve stenosis    Obesity    Pes anserinus tendinitis and bursitis    History of aortic valve replacement    S/P  "drug eluting coronary stent placement    Sinoatrial node dysfunction (Multi)    Sleep apnea    Thumb pain    Vitamin D deficiency     Past Surgical History:   Procedure Laterality Date    CARDIAC CATHETERIZATION N/A 12/13/2023    Procedure: Left Heart Cath, No LV;  Surgeon: Zach Gomez MD;  Location: [facility] Cardiac [facility];  Service: Cardiovascular;  Laterality: N/A;    CARDIAC CATHETERIZATION N/A 12/13/2023    Procedure: PCI;  Surgeon: Zach Gomez MD;  Location: [facility] Cardiac Cath Lab;  Service: Cardiovascular;  Laterality: N/A;     Objective:  Vitals:    05/10/24 0920   BP: 110/86   Pulse: 93   Height:     1.702 m (5' 7\")  Weight: 103 kg (226 lb 6.4 oz)     Exam:  Gen: Pleasant stocky gentleman in no distress.  HEENT: No scleral icterus; shaved head.  Neck: No jugular venous distention or thyromegaly.  Lungs: Clear to auscultation, with no wheezes or rales.  Left subclavian pacemaker pocket: Old vertical incisional scar.  Heart: Regular rhythm with grade 1/6 systolic ejection murmur and preserved S2.  Abdomen: Benign, with no organomegaly or masses.  Extremities: Intact distal pulses; no edema.  Neuro: No focal neurologic abnormalities.  Skin: Few tattoos on arms.    Device Check:  The pacemaker was checked and found to be functioning normally in the AAIR mode at 7130 bpm, which provides roughly 93% atrial pacing.  The sensing and pacing were good.  The device battery longevity is estimated at just 1 month.    Impressions:  1.  Hypertension, under good control today.  2.  Aortic valve disease and aortic root dilatation, status post bioprosthetic aortic valve replacement in 2020 with aortic root repair, I believe.  He is followed by Dr. Zach Gomez.  3.  Coronary artery disease, status post single-vessel CABG at time of aortic valve replacement.  4.  Sinus node dysfunction, status post Medtronic AAIR pacemakers in April 2006 and January 2016.  The device is nearly at the point of battery depletion.  5.  Other " medical issues, including mild obesity (BMI 35.46), and hyperlipidemia.    Recommendations:  1.  The patient's device will now be checked in the device clinic on a monthly basis until it reaches the elective replacement indicator.  2.  Once JAZIEL has been met, we will plan to set the patient up for a generator replacement, with the patient holding his metoprolol on the day of the procedure.  Atrial pacing will be done through the lead to assure good 1:1 AV conduction to at least 120 bpm.  If it is impaired, a ventricular lead could be placed at the same time, provided the subclavian vein is patent.      Leroy Delgado MD

## 2024-06-03 ENCOUNTER — ANCILLARY PROCEDURE (OUTPATIENT)
Dept: CARDIOLOGY | Facility: CLINIC | Age: 59
End: 2024-06-03
Payer: OTHER GOVERNMENT

## 2024-06-03 DIAGNOSIS — Z95.0 CARDIAC PACEMAKER: ICD-10-CM

## 2024-06-03 PROCEDURE — 93279 PRGRMG DEV EVAL PM/LDLS PM: CPT | Performed by: INTERNAL MEDICINE

## 2024-07-03 ENCOUNTER — TELEPHONE (OUTPATIENT)
Dept: CARDIOLOGY | Facility: CLINIC | Age: 59
End: 2024-07-03
Payer: OTHER GOVERNMENT

## 2024-07-03 NOTE — TELEPHONE ENCOUNTER
Wife called about a battery change on the patient's pace maker. The next device check appointment he has is 8/12/24. When he was in for his last pacer check on June 3rd he was told that it would be dead in 4 weeks. It has been over 4 weeks and he is concerned about what will happen if he needs it and his device is dead. He is wondering if it should be check sooner than aug 12. Please advise.

## 2024-07-09 NOTE — TELEPHONE ENCOUNTER
Spoke with wife and was able to get pt in sooner to get device checked. I also spoke with Vicky and we are going to speak with Dr. Delgado and see what he would like to do.

## 2024-07-12 ENCOUNTER — ANCILLARY PROCEDURE (OUTPATIENT)
Dept: CARDIOLOGY | Facility: CLINIC | Age: 59
End: 2024-07-12
Payer: OTHER GOVERNMENT

## 2024-07-12 DIAGNOSIS — I49.5 SSS (SICK SINUS SYNDROME) (MULTI): Primary | ICD-10-CM

## 2024-07-12 DIAGNOSIS — Z95.0 CARDIAC PACEMAKER: ICD-10-CM

## 2024-07-12 PROCEDURE — 93279 PRGRMG DEV EVAL PM/LDLS PM: CPT | Performed by: INTERNAL MEDICINE

## 2024-07-22 ENCOUNTER — TELEPHONE (OUTPATIENT)
Dept: CARDIOLOGY | Facility: CLINIC | Age: 59
End: 2024-07-22
Payer: OTHER GOVERNMENT

## 2024-07-22 NOTE — TELEPHONE ENCOUNTER
Patient is having a pacemaker change on 7/29 and is wondering if the  Has chosen to put in a 2 lead pace maker and if so will he need to spend the night in the hospital?

## 2024-07-29 ENCOUNTER — HOSPITAL ENCOUNTER (OUTPATIENT)
Facility: HOSPITAL | Age: 59
Setting detail: OUTPATIENT SURGERY
Discharge: HOME | End: 2024-07-29
Attending: INTERNAL MEDICINE | Admitting: INTERNAL MEDICINE
Payer: OTHER GOVERNMENT

## 2024-07-29 VITALS
DIASTOLIC BLOOD PRESSURE: 84 MMHG | WEIGHT: 220 LBS | RESPIRATION RATE: 16 BRPM | TEMPERATURE: 97.2 F | HEIGHT: 67 IN | BODY MASS INDEX: 34.53 KG/M2 | HEART RATE: 74 BPM | OXYGEN SATURATION: 98 % | SYSTOLIC BLOOD PRESSURE: 133 MMHG

## 2024-07-29 DIAGNOSIS — I49.5 SSS (SICK SINUS SYNDROME) (MULTI): ICD-10-CM

## 2024-07-29 DIAGNOSIS — Z95.0 CARDIAC PACEMAKER: Primary | ICD-10-CM

## 2024-07-29 LAB
ANION GAP SERPL CALC-SCNC: 11 MMOL/L (ref 10–20)
BUN SERPL-MCNC: 19 MG/DL (ref 6–23)
CALCIUM SERPL-MCNC: 9.1 MG/DL (ref 8.6–10.3)
CHLORIDE SERPL-SCNC: 103 MMOL/L (ref 98–107)
CO2 SERPL-SCNC: 27 MMOL/L (ref 21–32)
CREAT SERPL-MCNC: 0.82 MG/DL (ref 0.5–1.3)
EGFRCR SERPLBLD CKD-EPI 2021: >90 ML/MIN/1.73M*2
ERYTHROCYTE [DISTWIDTH] IN BLOOD BY AUTOMATED COUNT: 13.5 % (ref 11.5–14.5)
GLUCOSE SERPL-MCNC: 91 MG/DL (ref 74–99)
HCT VFR BLD AUTO: 47.1 % (ref 41–52)
HGB BLD-MCNC: 15.3 G/DL (ref 13.5–17.5)
MCH RBC QN AUTO: 30.8 PG (ref 26–34)
MCHC RBC AUTO-ENTMCNC: 32.5 G/DL (ref 32–36)
MCV RBC AUTO: 95 FL (ref 80–100)
NRBC BLD-RTO: 0 /100 WBCS (ref 0–0)
PLATELET # BLD AUTO: 208 X10*3/UL (ref 150–450)
POTASSIUM SERPL-SCNC: 4.3 MMOL/L (ref 3.5–5.3)
RBC # BLD AUTO: 4.96 X10*6/UL (ref 4.5–5.9)
SODIUM SERPL-SCNC: 137 MMOL/L (ref 136–145)
WBC # BLD AUTO: 8.6 X10*3/UL (ref 4.4–11.3)

## 2024-07-29 PROCEDURE — C1786 PMKR, SINGLE, RATE-RESP: HCPCS | Performed by: INTERNAL MEDICINE

## 2024-07-29 PROCEDURE — 99153 MOD SED SAME PHYS/QHP EA: CPT | Performed by: INTERNAL MEDICINE

## 2024-07-29 PROCEDURE — 85027 COMPLETE CBC AUTOMATED: CPT | Performed by: INTERNAL MEDICINE

## 2024-07-29 PROCEDURE — 2720000007 HC OR 272 NO HCPCS: Performed by: INTERNAL MEDICINE

## 2024-07-29 PROCEDURE — 96376 TX/PRO/DX INJ SAME DRUG ADON: CPT | Performed by: INTERNAL MEDICINE

## 2024-07-29 PROCEDURE — 7100000009 HC PHASE TWO TIME - INITIAL BASE CHARGE: Performed by: INTERNAL MEDICINE

## 2024-07-29 PROCEDURE — 99152 MOD SED SAME PHYS/QHP 5/>YRS: CPT | Performed by: INTERNAL MEDICINE

## 2024-07-29 PROCEDURE — 36415 COLL VENOUS BLD VENIPUNCTURE: CPT | Performed by: INTERNAL MEDICINE

## 2024-07-29 PROCEDURE — 2500000004 HC RX 250 GENERAL PHARMACY W/ HCPCS (ALT 636 FOR OP/ED): Performed by: INTERNAL MEDICINE

## 2024-07-29 PROCEDURE — 7100000010 HC PHASE TWO TIME - EACH INCREMENTAL 1 MINUTE: Performed by: INTERNAL MEDICINE

## 2024-07-29 PROCEDURE — 82374 ASSAY BLOOD CARBON DIOXIDE: CPT | Performed by: INTERNAL MEDICINE

## 2024-07-29 PROCEDURE — 2750000001 HC OR 275 NO HCPCS: Performed by: INTERNAL MEDICINE

## 2024-07-29 PROCEDURE — 33227 REMOVE&REPLACE PM GEN SINGL: CPT | Performed by: INTERNAL MEDICINE

## 2024-07-29 PROCEDURE — 2500000005 HC RX 250 GENERAL PHARMACY W/O HCPCS: Performed by: INTERNAL MEDICINE

## 2024-07-29 DEVICE — IPG W1SR01 AZURE XT SR MRI WL USA BCP
Type: IMPLANTABLE DEVICE | Site: CHEST | Status: FUNCTIONAL
Brand: AZURE™ XT SR MRI SURESCAN™

## 2024-07-29 RX ORDER — CEFAZOLIN 1 G/1
INJECTION, POWDER, FOR SOLUTION INTRAVENOUS AS NEEDED
Status: DISCONTINUED | OUTPATIENT
Start: 2024-07-29 | End: 2024-07-29 | Stop reason: HOSPADM

## 2024-07-29 RX ORDER — FENTANYL CITRATE 50 UG/ML
INJECTION, SOLUTION INTRAMUSCULAR; INTRAVENOUS AS NEEDED
Status: DISCONTINUED | OUTPATIENT
Start: 2024-07-29 | End: 2024-07-29 | Stop reason: HOSPADM

## 2024-07-29 RX ORDER — SODIUM CHLORIDE 9 MG/ML
50 INJECTION, SOLUTION INTRAVENOUS CONTINUOUS
Status: DISCONTINUED | OUTPATIENT
Start: 2024-07-29 | End: 2024-07-29 | Stop reason: HOSPADM

## 2024-07-29 RX ORDER — ACETAMINOPHEN 325 MG/1
650 TABLET ORAL ONCE
Status: COMPLETED | OUTPATIENT
Start: 2024-07-29 | End: 2024-07-29

## 2024-07-29 RX ORDER — MIDAZOLAM HYDROCHLORIDE 1 MG/ML
INJECTION, SOLUTION INTRAMUSCULAR; INTRAVENOUS AS NEEDED
Status: DISCONTINUED | OUTPATIENT
Start: 2024-07-29 | End: 2024-07-29 | Stop reason: HOSPADM

## 2024-07-29 RX ORDER — CEFAZOLIN SODIUM 2 G/50ML
SOLUTION INTRAVENOUS CONTINUOUS PRN
Status: DISCONTINUED | OUTPATIENT
Start: 2024-07-29 | End: 2024-07-29 | Stop reason: HOSPADM

## 2024-07-29 RX ORDER — LIDOCAINE HYDROCHLORIDE 20 MG/ML
INJECTION, SOLUTION INFILTRATION; PERINEURAL AS NEEDED
Status: DISCONTINUED | OUTPATIENT
Start: 2024-07-29 | End: 2024-07-29 | Stop reason: HOSPADM

## 2024-07-29 ASSESSMENT — PAIN SCALES - GENERAL
PAINLEVEL_OUTOF10: 0 - NO PAIN
PAINLEVEL_OUTOF10: 5 - MODERATE PAIN
PAINLEVEL_OUTOF10: 0 - NO PAIN

## 2024-07-29 ASSESSMENT — PAIN - FUNCTIONAL ASSESSMENT
PAIN_FUNCTIONAL_ASSESSMENT: 0-10
PAIN_FUNCTIONAL_ASSESSMENT: 0-10

## 2024-07-29 ASSESSMENT — COLUMBIA-SUICIDE SEVERITY RATING SCALE - C-SSRS
6. HAVE YOU EVER DONE ANYTHING, STARTED TO DO ANYTHING, OR PREPARED TO DO ANYTHING TO END YOUR LIFE?: NO
1. IN THE PAST MONTH, HAVE YOU WISHED YOU WERE DEAD OR WISHED YOU COULD GO TO SLEEP AND NOT WAKE UP?: NO
2. HAVE YOU ACTUALLY HAD ANY THOUGHTS OF KILLING YOURSELF?: NO

## 2024-07-29 ASSESSMENT — ENCOUNTER SYMPTOMS
MUSCULOSKELETAL NEGATIVE: 1
ENDOCRINE NEGATIVE: 1
CONSTITUTIONAL NEGATIVE: 1
CARDIOVASCULAR NEGATIVE: 1
EYES NEGATIVE: 1
NEUROLOGICAL NEGATIVE: 1
ALLERGIC/IMMUNOLOGIC NEGATIVE: 1
RESPIRATORY NEGATIVE: 1
GASTROINTESTINAL NEGATIVE: 1
PSYCHIATRIC NEGATIVE: 1

## 2024-07-29 NOTE — H&P
History Of Present Illness  Otis Connors is a 59 y.o. male  with hx of s/p pacemaker /SSS in 4/2006 and general changed out in 2016 .HTN.s/p AVR .CAD s/p CABg presenting with elective general change out for JAZIEL of pacemaker .The pt denies any chest pain or SOB.he has been follow up with Dr Delgado routinely as outpt, his recent device check shown JAZIEL.       Past Medical History  He has a past medical history of CAD (coronary artery disease), CKD (chronic kidney disease), Coronary artery disease involving autologous artery coronary bypass graft, Dyslipidemia, HTN (hypertension), LEXIS (obstructive sleep apnea), Pacemaker, PTSD (post-traumatic stress disorder), and SSS (sick sinus syndrome) (Multi).    Surgical History  He has a past surgical history that includes Cardiac catheterization (N/A, 12/13/2023) and Cardiac catheterization (N/A, 12/13/2023).     Social History  He reports that he has quit smoking. His smoking use included cigarettes. He has never used smokeless tobacco. He reports that he does not currently use alcohol. He reports that he does not use drugs.    Family History  Family History   Problem Relation Name Age of Onset    Other (cardiac disorder) Father      Hypertension Father          Allergies  Ace inhibitors  Scheduled medications  No current facility-administered medications on file prior to encounter.     Current Outpatient Medications on File Prior to Encounter   Medication Sig Dispense Refill    aspirin 81 mg chewable tablet Chew 1 tablet (81 mg) once daily in the morning.      busPIRone (Buspar) 15 mg tablet Take 2 tablets (30 mg) by mouth 2 times a day.      cholecalciferol (Vitamin D-3) 50 mcg (2,000 unit) capsule Take 1 capsule (50 mcg) by mouth once daily in the morning.      esomeprazole (NexIUM) 40 mg DR capsule Take 1 capsule (40 mg) by mouth once daily as needed (heartburn).      ezetimibe (Zetia) 10 mg tablet Take 1 tablet (10 mg) by mouth once daily.      FLUoxetine (PROzac)  20 mg capsule Take 3 capsules (60 mg) by mouth once daily in the morning.      metoprolol tartrate (Lopressor) 25 mg tablet Take 0.5 tablets (12.5 mg) by mouth 2 times a day.      multivitamin with minerals (multivit-min-iron fum-folic ac) tablet Take 1 tablet by mouth once daily.      rosuvastatin (Crestor) 40 mg tablet Take 1 tablet (40 mg) by mouth once daily at bedtime.      albuterol 90 mcg/actuation inhaler Inhale 2 puffs every 4 hours if needed for wheezing.      furosemide (Lasix) 20 mg tablet Take 1 tablet (20 mg) by mouth once daily as needed.      LORazepam (Ativan) 1 mg tablet Take 1 tablet (1 mg) by mouth once daily as needed for anxiety.      nitroglycerin (Nitrostat) 0.4 mg SL tablet DISSOLVE 1 TABLET UNDER THE TONGUE AS NEEDED FOR CHEST PAIN- MAY REPEAT EVERY 5 MINUTES IF NEEDED ( MAX 3 DOSES.- IF NO RELIEF CALL 911)      [DISCONTINUED] acetaminophen (Tylenol) 325 mg tablet Take 1-2 tablets (325-650 mg) by mouth every 4 hours if needed.      [DISCONTINUED] amLODIPine (Norvasc) 2.5 mg tablet Take 1 tablet (2.5 mg) by mouth once daily in the morning.      [DISCONTINUED] fluticasone (Flonase) 50 mcg/actuation nasal spray Administer 1 spray into each nostril once daily as needed for allergies.      [DISCONTINUED] pantoprazole (ProtoNix) 40 mg EC tablet Take 1 tablet (40 mg) by mouth once daily.          Review of Systems   Constitutional: Negative.    HENT: Negative.     Eyes: Negative.    Respiratory: Negative.     Cardiovascular: Negative.    Gastrointestinal: Negative.    Endocrine: Negative.    Genitourinary: Negative.    Musculoskeletal: Negative.    Allergic/Immunologic: Negative.    Neurological: Negative.    Psychiatric/Behavioral: Negative.          Physical Exam  Constitutional:       Appearance: Normal appearance.   HENT:      Head: Normocephalic.   Cardiovascular:      Rate and Rhythm: Normal rate.   Pulmonary:      Effort: Pulmonary effort is normal.   Abdominal:      General: Abdomen is  "flat.   Musculoskeletal:      Cervical back: Normal range of motion.   Skin:     Comments: Well healed incision at middle line and left upper chest     Neurological:      Mental Status: He is oriented to person, place, and time.   Psychiatric:         Mood and Affect: Mood normal.          Last Recorded Vitals  Blood pressure 107/69, pulse 73, temperature 36.2 °C (97.2 °F), temperature source Temporal, resp. rate 14, height 1.702 m (5' 7\"), weight 99.8 kg (220 lb), SpO2 96%.    Relevant Results         Labs:     Lab Results   Component Value Date    TROPONINI <0.02 04/23/2021         Lab Results   Component Value Date    TROPHS 3 12/13/2023    TROPHS 3 12/13/2023      Lab Results   Component Value Date    GLUCOSE 95 12/14/2023    CALCIUM 8.5 (L) 12/14/2023     12/14/2023    K 3.7 12/14/2023    CO2 27 12/14/2023     12/14/2023    BUN 14 12/14/2023    CREATININE 0.80 12/14/2023      Lab Results   Component Value Date    WBC 8.6 07/29/2024    HGB 15.3 07/29/2024    HCT 47.1 07/29/2024    MCV 95 07/29/2024     07/29/2024        LABS:  ABGs: No results found for: \"PH\"  PRO-BNP: No results found for: \"PROBNP\"   TSH: No results found for: \"TSH\"   Lipid Profile: No results found for: \"TRIG\", \"HDL\", \"LDLCALC\", \"CHOL\"   Hemoglobin A1C: No results found for: \"HGBA1C\"   Magnesium:  No results found for: \"MG\"           Assessment/Plan   Principal Problem:    SSS (sick sinus syndrome) (Multi)    1.S/p pacemaker/SSS; A 59 y.o. male  with hx of s/p pacemaker -Medtronic /SSS in 4/2006 and general changed out in 2016 .HTN.s/p AVR .CAD s/p CABg presenting with elective general change out for JAZIEL of pacemaker .The pt denies any chest pain or SOB.he has been follow up with Dr Delgado routinely as outpt, his recent device check shown JAZIEL.The pt saw Dr Delgado with last visiting  on 5/6/24   The pt took all of his meds including metoprolol this am   Lab; K+ 4.3 H/H 15.3/47  creatinine 0.82   -  -The pt will be " discharge after recovery from the procedure, continue follow as outpt with Dr Delgado and device clinic     2. CAD s/p CABG  stable follow by Dr Gomez   3.s/p AVR;stable                  Maryam Osullivan, MACIEL-CNP

## 2024-07-29 NOTE — Clinical Note
The PACEMAKER, LUIS XT SR MRI - [id number] device was inserted. The leads were placed into the connector and visually verified to be in correct position. Injury current obtained.

## 2024-08-03 ENCOUNTER — HOSPITAL ENCOUNTER (EMERGENCY)
Facility: HOSPITAL | Age: 59
Discharge: HOME | End: 2024-08-03
Attending: EMERGENCY MEDICINE
Payer: OTHER GOVERNMENT

## 2024-08-03 ENCOUNTER — APPOINTMENT (OUTPATIENT)
Dept: CARDIOLOGY | Facility: HOSPITAL | Age: 59
End: 2024-08-03
Payer: OTHER GOVERNMENT

## 2024-08-03 ENCOUNTER — APPOINTMENT (OUTPATIENT)
Dept: RADIOLOGY | Facility: HOSPITAL | Age: 59
End: 2024-08-03
Payer: OTHER GOVERNMENT

## 2024-08-03 VITALS
HEIGHT: 67 IN | OXYGEN SATURATION: 100 % | DIASTOLIC BLOOD PRESSURE: 78 MMHG | SYSTOLIC BLOOD PRESSURE: 128 MMHG | BODY MASS INDEX: 34.37 KG/M2 | RESPIRATION RATE: 18 BRPM | HEART RATE: 72 BPM | WEIGHT: 219 LBS | TEMPERATURE: 97.3 F

## 2024-08-03 DIAGNOSIS — G89.18 PAIN AT SURGICAL SITE: Primary | ICD-10-CM

## 2024-08-03 LAB
ALBUMIN SERPL BCP-MCNC: 4.3 G/DL (ref 3.4–5)
ALP SERPL-CCNC: 59 U/L (ref 33–120)
ALT SERPL W P-5'-P-CCNC: 107 U/L (ref 10–52)
ANION GAP SERPL CALC-SCNC: 8 MMOL/L (ref 10–20)
AST SERPL W P-5'-P-CCNC: 77 U/L (ref 9–39)
BASOPHILS # BLD AUTO: 0.05 X10*3/UL (ref 0–0.1)
BASOPHILS NFR BLD AUTO: 0.7 %
BILIRUB SERPL-MCNC: 0.5 MG/DL (ref 0–1.2)
BUN SERPL-MCNC: 14 MG/DL (ref 6–23)
CALCIUM SERPL-MCNC: 9.6 MG/DL (ref 8.6–10.3)
CARDIAC TROPONIN I PNL SERPL HS: 5 NG/L (ref 0–20)
CHLORIDE SERPL-SCNC: 102 MMOL/L (ref 98–107)
CO2 SERPL-SCNC: 32 MMOL/L (ref 21–32)
CREAT SERPL-MCNC: 0.9 MG/DL (ref 0.5–1.3)
EGFRCR SERPLBLD CKD-EPI 2021: >90 ML/MIN/1.73M*2
EOSINOPHIL # BLD AUTO: 0.24 X10*3/UL (ref 0–0.7)
EOSINOPHIL NFR BLD AUTO: 3.2 %
ERYTHROCYTE [DISTWIDTH] IN BLOOD BY AUTOMATED COUNT: 13.5 % (ref 11.5–14.5)
GLUCOSE SERPL-MCNC: 93 MG/DL (ref 74–99)
HCT VFR BLD AUTO: 49.6 % (ref 41–52)
HGB BLD-MCNC: 16.1 G/DL (ref 13.5–17.5)
HOLD SPECIMEN: NORMAL
IMM GRANULOCYTES # BLD AUTO: 0.03 X10*3/UL (ref 0–0.7)
IMM GRANULOCYTES NFR BLD AUTO: 0.4 % (ref 0–0.9)
LYMPHOCYTES # BLD AUTO: 2.17 X10*3/UL (ref 1.2–4.8)
LYMPHOCYTES NFR BLD AUTO: 29.1 %
MAGNESIUM SERPL-MCNC: 2 MG/DL (ref 1.6–2.4)
MCH RBC QN AUTO: 31 PG (ref 26–34)
MCHC RBC AUTO-ENTMCNC: 32.5 G/DL (ref 32–36)
MCV RBC AUTO: 96 FL (ref 80–100)
MONOCYTES # BLD AUTO: 0.67 X10*3/UL (ref 0.1–1)
MONOCYTES NFR BLD AUTO: 9 %
NEUTROPHILS # BLD AUTO: 4.3 X10*3/UL (ref 1.2–7.7)
NEUTROPHILS NFR BLD AUTO: 57.6 %
NRBC BLD-RTO: 0 /100 WBCS (ref 0–0)
PLATELET # BLD AUTO: 236 X10*3/UL (ref 150–450)
POTASSIUM SERPL-SCNC: 4.3 MMOL/L (ref 3.5–5.3)
PROT SERPL-MCNC: 6.8 G/DL (ref 6.4–8.2)
RBC # BLD AUTO: 5.19 X10*6/UL (ref 4.5–5.9)
SODIUM SERPL-SCNC: 138 MMOL/L (ref 136–145)
WBC # BLD AUTO: 7.5 X10*3/UL (ref 4.4–11.3)

## 2024-08-03 PROCEDURE — 2500000004 HC RX 250 GENERAL PHARMACY W/ HCPCS (ALT 636 FOR OP/ED)

## 2024-08-03 PROCEDURE — 93005 ELECTROCARDIOGRAM TRACING: CPT

## 2024-08-03 PROCEDURE — 96372 THER/PROPH/DIAG INJ SC/IM: CPT

## 2024-08-03 PROCEDURE — 71046 X-RAY EXAM CHEST 2 VIEWS: CPT

## 2024-08-03 PROCEDURE — 93010 ELECTROCARDIOGRAM REPORT: CPT | Performed by: EMERGENCY MEDICINE

## 2024-08-03 PROCEDURE — 99285 EMERGENCY DEPT VISIT HI MDM: CPT | Performed by: EMERGENCY MEDICINE

## 2024-08-03 PROCEDURE — 84484 ASSAY OF TROPONIN QUANT: CPT

## 2024-08-03 PROCEDURE — 71046 X-RAY EXAM CHEST 2 VIEWS: CPT | Mod: FOREIGN READ | Performed by: RADIOLOGY

## 2024-08-03 PROCEDURE — 36415 COLL VENOUS BLD VENIPUNCTURE: CPT

## 2024-08-03 PROCEDURE — 2500000001 HC RX 250 WO HCPCS SELF ADMINISTERED DRUGS (ALT 637 FOR MEDICARE OP)

## 2024-08-03 PROCEDURE — 84075 ASSAY ALKALINE PHOSPHATASE: CPT

## 2024-08-03 PROCEDURE — 99283 EMERGENCY DEPT VISIT LOW MDM: CPT | Mod: 25

## 2024-08-03 PROCEDURE — 85025 COMPLETE CBC W/AUTO DIFF WBC: CPT

## 2024-08-03 PROCEDURE — 83735 ASSAY OF MAGNESIUM: CPT

## 2024-08-03 RX ORDER — CEPHALEXIN 500 MG/1
500 CAPSULE ORAL ONCE
Status: COMPLETED | OUTPATIENT
Start: 2024-08-03 | End: 2024-08-03

## 2024-08-03 RX ORDER — KETOROLAC TROMETHAMINE 15 MG/ML
15 INJECTION, SOLUTION INTRAMUSCULAR; INTRAVENOUS ONCE
Status: COMPLETED | OUTPATIENT
Start: 2024-08-03 | End: 2024-08-03

## 2024-08-03 RX ORDER — KETOROLAC TROMETHAMINE 15 MG/ML
15 INJECTION, SOLUTION INTRAMUSCULAR; INTRAVENOUS ONCE
Status: DISCONTINUED | OUTPATIENT
Start: 2024-08-03 | End: 2024-08-03

## 2024-08-03 RX ORDER — CEPHALEXIN 500 MG/1
500 CAPSULE ORAL 2 TIMES DAILY
Qty: 14 CAPSULE | Refills: 0 | Status: SHIPPED | OUTPATIENT
Start: 2024-08-03 | End: 2024-08-10

## 2024-08-03 RX ADMIN — KETOROLAC TROMETHAMINE 15 MG: 15 INJECTION, SOLUTION INTRAMUSCULAR; INTRAVENOUS at 13:49

## 2024-08-03 RX ADMIN — CEPHALEXIN 500 MG: 500 CAPSULE ORAL at 15:16

## 2024-08-03 ASSESSMENT — PAIN SCALES - GENERAL
PAINLEVEL_OUTOF10: 8

## 2024-08-03 ASSESSMENT — COLUMBIA-SUICIDE SEVERITY RATING SCALE - C-SSRS
2. HAVE YOU ACTUALLY HAD ANY THOUGHTS OF KILLING YOURSELF?: NO
1. IN THE PAST MONTH, HAVE YOU WISHED YOU WERE DEAD OR WISHED YOU COULD GO TO SLEEP AND NOT WAKE UP?: NO
6. HAVE YOU EVER DONE ANYTHING, STARTED TO DO ANYTHING, OR PREPARED TO DO ANYTHING TO END YOUR LIFE?: NO

## 2024-08-03 ASSESSMENT — HEART SCORE
HEART SCORE: 2
TROPONIN: LESS THAN OR EQUAL TO NORMAL LIMIT
RISK FACTORS: 1-2 RISK FACTORS
ECG: NORMAL
AGE: 45-64
HISTORY: SLIGHTLY SUSPICIOUS

## 2024-08-03 ASSESSMENT — PAIN DESCRIPTION - ORIENTATION
ORIENTATION: LEFT
ORIENTATION: LEFT

## 2024-08-03 ASSESSMENT — LIFESTYLE VARIABLES
EVER FELT BAD OR GUILTY ABOUT YOUR DRINKING: NO
EVER HAD A DRINK FIRST THING IN THE MORNING TO STEADY YOUR NERVES TO GET RID OF A HANGOVER: NO
TOTAL SCORE: 0
HAVE YOU EVER FELT YOU SHOULD CUT DOWN ON YOUR DRINKING: NO
HAVE PEOPLE ANNOYED YOU BY CRITICIZING YOUR DRINKING: NO

## 2024-08-03 ASSESSMENT — PAIN - FUNCTIONAL ASSESSMENT: PAIN_FUNCTIONAL_ASSESSMENT: 0-10

## 2024-08-03 ASSESSMENT — PAIN DESCRIPTION - LOCATION
LOCATION: SHOULDER
LOCATION: CHEST

## 2024-08-03 NOTE — ED PROVIDER NOTES
Emergency Department Provider Note        History of Present Illness     History provided by: Patient  Limitations to History: None  External Records Reviewed with Brief Summary: Inpatient note from Maryam Osullivan, MACIEL-CNP, 7/29/2024 which showed patient's medical history and change of JAZIEL of pacemaker.    HPI:  Otis Connors is a 59 y.o. male with history of s/p pacemaker /SSS in 4/2006 w battery exchange 7/29/2024, HTN s/p AVR, and CAD s/p CABg presenting to the ED with complaint of left chest soreness.  Patient reports having left upper chest soreness at the area of his incision since 4 AM this morning. Patient reports having had his pacemaker battery exchanged 5 days ago where he began experiencing soreness isolated to the incisional site this morning.  Reports the area feels hot and he has felt generally hot. Procedure done by Dr. Delgado whom he has a follow-up appointment in 3 days.    Physical Exam   Triage vitals:  T 36.3 °C (97.3 °F)  HR 72  /72  RR 15  O2 99 % None (Room air)    Physical Exam  Vitals and nursing note reviewed.   Constitutional:       General: He is not in acute distress.     Appearance: He is well-developed.   HENT:      Head: Normocephalic and atraumatic.      Right Ear: External ear normal.      Left Ear: External ear normal.      Nose: Nose normal. No congestion or rhinorrhea.      Mouth/Throat:      Mouth: Mucous membranes are moist.      Pharynx: No oropharyngeal exudate or posterior oropharyngeal erythema.   Eyes:      General:         Right eye: No discharge.         Left eye: No discharge.      Extraocular Movements: Extraocular movements intact.      Conjunctiva/sclera: Conjunctivae normal.   Cardiovascular:      Rate and Rhythm: Normal rate and regular rhythm.      Pulses: Normal pulses.      Heart sounds: No murmur (grade 3 or above) heard.     No friction rub.   Pulmonary:      Effort: Pulmonary effort is normal. No respiratory distress.      Breath sounds: Normal  breath sounds. No stridor. No wheezing or rales.   Abdominal:      General: There is no distension.      Palpations: Abdomen is soft.      Tenderness: There is no abdominal tenderness. There is no guarding.   Musculoskeletal:         General: Tenderness (at left chest incisional site) present. No swelling, deformity or signs of injury. Normal range of motion.      Cervical back: Neck supple.      Right lower leg: No edema.      Left lower leg: No edema.   Skin:     General: Skin is warm and dry.      Capillary Refill: Capillary refill takes less than 2 seconds.      Coloration: Skin is not jaundiced or pale.      Findings: No lesion or rash.      Comments: No swelling, erythema, or discharge at incisional site   Neurological:      General: No focal deficit present.      Mental Status: He is alert. Mental status is at baseline.      Cranial Nerves: No cranial nerve deficit.      Sensory: No sensory deficit.      Motor: No weakness.   Psychiatric:         Mood and Affect: Mood normal.         Behavior: Behavior normal.         Thought Content: Thought content normal.         Judgment: Judgment normal.          Medical Decision Making & ED Course   Medical Decision Makin y.o. male with a history significant for s/p pacemaker /SSS with JAZIEL change 5d ago presenting to the ED with complaint of left upper chest soreness the incisional site.  Patient is afebrile, sinus, normotensive, saturating well on room air, and in no acute distress.  On exam there is tenderness palpation at the incisional site however there is no erythema, discharge, or swelling.  Cardiac workup performed.    Cardiac workup is reassuring.  Due to localized tenderness and pain at the surgical site we will prophylactically treat with Keflex.  1 dose of Keflex was given in the ER and prescription for 7 days.  He will follow-up with Dr. Delgado in 3 days as scheduled.  ----  Scoring Tools Utilized: HEART Score: 2       Differential diagnoses  considered include but are not limited to: Cellulitis, myositis, pericarditis, abscess, electrode dislodgment     Social Determinants of Health which Significantly Impact Care: None identified     EKG Independent Interpretation:  EKG interpreted by ED attending and included in MDM.  I reviewed and agree with interpretation.    Independent Result Review and Interpretation: Relevant laboratory and radiographic results were reviewed and independently interpreted by myself.  As necessary, they are commented on in the ED Course.    Chronic conditions affecting the patient's care: As documented above in MDM    The patient was discussed with the following consultants/services: None    Care Considerations: Considered ordering pacemaker interrogation as well as consultation with Dr. Delgado , but chose not to because EKG is reassuring and patient is not endorsing any systemic or cardiac symptoms.    ED Course:  ED Course as of 08/04/24 1017   Sat Aug 03, 2024   1218 EKG performed at 12: 08 and independently reviewed by provider: Reveals atrially paced rhythm with a rate of 73 bpm, normal axis, normal intervals, no ST changes, no T wave abnormalities, no ectopy. No STEMI.  PAC noted [TL]   1300 On my interpretation of labs, results are unimpressive for systemic inflammation or infection, acute anemia or blood loss, JESUS, ACS, hepatitis, or electrolyte abnormalities. [ES]   1415 XR chest 2 views  CXR unimpressive for pneumonia, including effusions, infiltrates, or consolidations and no sign of pneumothorax or aortic widening. [ES]      ED Course User Index  [ES] Jaycob Desai MD  [TL] Irwin Shoemaker, DO         Diagnoses as of 08/04/24 1017   Pain at surgical site     Disposition   As a result of the work-up, the patient was discharged home.  he was informed of his diagnosis and instructed to come back with any concerns or worsening of condition.  he and was agreeable to the plan as discussed above.  he was given the  opportunity to ask questions.  All of the patient's questions were answered.    Procedures   Procedures    Patient seen and discussed with ED attending physician.    Jaycob Desai MD  Emergency Medicine       Jaycob Desai MD  Resident  08/04/24 1722

## 2024-08-03 NOTE — DISCHARGE INSTRUCTIONS
Please utilize ibuprofen and tylenol as needed for pain.    Seek immediate medical attention if your wound develops: redness, swelling, warmth to touch, discharge or drainage, increasing pain, or any new or worsening symptoms.

## 2024-08-06 ENCOUNTER — APPOINTMENT (OUTPATIENT)
Dept: PRIMARY CARE | Facility: CLINIC | Age: 59
End: 2024-08-06
Payer: OTHER GOVERNMENT

## 2024-08-06 VITALS
BODY MASS INDEX: 35.08 KG/M2 | SYSTOLIC BLOOD PRESSURE: 112 MMHG | HEART RATE: 76 BPM | WEIGHT: 224 LBS | TEMPERATURE: 97.9 F | DIASTOLIC BLOOD PRESSURE: 80 MMHG

## 2024-08-06 DIAGNOSIS — I49.5 SSS (SICK SINUS SYNDROME) (MULTI): ICD-10-CM

## 2024-08-06 NOTE — PROGRESS NOTES
Patient present for wound check. Patient had pacemaker generator change with Dr Delgado at [facility] on 7/29/2024.    Patient was seen in the ER on 8/3/24 for pain in the site of the pacemaker. Patient was treated with Keflex and says he is no longer having any pain.     Bandage fell off since it was removed multiple times for ER physicians to examine incision. Patient kept it covered with Gauze. I removed the gauze. Incision looks to be healing well, edges are well approximated, no ecchymosis present, no signs or symptoms of infection present at this time.

## 2024-08-07 LAB
ATRIAL RATE: 73 BPM
P AXIS: 74 DEGREES
P OFFSET: 195 MS
P ONSET: 139 MS
PR INTERVAL: 156 MS
Q ONSET: 219 MS
QRS COUNT: 12 BEATS
QRS DURATION: 80 MS
QT INTERVAL: 406 MS
QTC CALCULATION(BAZETT): 447 MS
QTC FREDERICIA: 433 MS
R AXIS: 81 DEGREES
T AXIS: 66 DEGREES
T OFFSET: 422 MS
VENTRICULAR RATE: 73 BPM

## 2024-08-12 ENCOUNTER — APPOINTMENT (OUTPATIENT)
Dept: CARDIOLOGY | Facility: CLINIC | Age: 59
End: 2024-08-12
Payer: OTHER GOVERNMENT

## 2024-11-01 ENCOUNTER — APPOINTMENT (OUTPATIENT)
Dept: CARDIOLOGY | Facility: HOSPITAL | Age: 59
End: 2024-11-01
Payer: OTHER GOVERNMENT

## 2024-11-01 ENCOUNTER — HOSPITAL ENCOUNTER (EMERGENCY)
Facility: HOSPITAL | Age: 59
Discharge: LEFT BEFORE DISCHARGE | End: 2024-11-01
Payer: OTHER GOVERNMENT

## 2024-11-01 VITALS
OXYGEN SATURATION: 99 % | BODY MASS INDEX: 35.65 KG/M2 | SYSTOLIC BLOOD PRESSURE: 113 MMHG | RESPIRATION RATE: 16 BRPM | HEART RATE: 81 BPM | WEIGHT: 214 LBS | DIASTOLIC BLOOD PRESSURE: 77 MMHG | HEIGHT: 65 IN | TEMPERATURE: 97.5 F

## 2024-11-01 LAB
ALBUMIN SERPL BCP-MCNC: 4.4 G/DL (ref 3.4–5)
ALP SERPL-CCNC: 47 U/L (ref 33–120)
ALT SERPL W P-5'-P-CCNC: 44 U/L (ref 10–52)
ANION GAP SERPL CALC-SCNC: 14 MMOL/L (ref 10–20)
AST SERPL W P-5'-P-CCNC: 28 U/L (ref 9–39)
ATRIAL RATE: 76 BPM
BASOPHILS # BLD AUTO: 0.06 X10*3/UL (ref 0–0.1)
BASOPHILS NFR BLD AUTO: 0.6 %
BILIRUB SERPL-MCNC: 0.9 MG/DL (ref 0–1.2)
BNP SERPL-MCNC: 60 PG/ML (ref 0–99)
BUN SERPL-MCNC: 16 MG/DL (ref 6–23)
CALCIUM SERPL-MCNC: 9.3 MG/DL (ref 8.6–10.3)
CARDIAC TROPONIN I PNL SERPL HS: 4 NG/L (ref 0–20)
CARDIAC TROPONIN I PNL SERPL HS: 4 NG/L (ref 0–20)
CHLORIDE SERPL-SCNC: 99 MMOL/L (ref 98–107)
CO2 SERPL-SCNC: 27 MMOL/L (ref 21–32)
CREAT SERPL-MCNC: 0.86 MG/DL (ref 0.5–1.3)
EGFRCR SERPLBLD CKD-EPI 2021: >90 ML/MIN/1.73M*2
EOSINOPHIL # BLD AUTO: 0.22 X10*3/UL (ref 0–0.7)
EOSINOPHIL NFR BLD AUTO: 2.2 %
ERYTHROCYTE [DISTWIDTH] IN BLOOD BY AUTOMATED COUNT: 13.5 % (ref 11.5–14.5)
GLUCOSE SERPL-MCNC: 104 MG/DL (ref 74–99)
HCT VFR BLD AUTO: 47.9 % (ref 41–52)
HGB BLD-MCNC: 16 G/DL (ref 13.5–17.5)
IMM GRANULOCYTES # BLD AUTO: 0.02 X10*3/UL (ref 0–0.7)
IMM GRANULOCYTES NFR BLD AUTO: 0.2 % (ref 0–0.9)
INR PPP: 1 (ref 0.9–1.1)
LYMPHOCYTES # BLD AUTO: 2.8 X10*3/UL (ref 1.2–4.8)
LYMPHOCYTES NFR BLD AUTO: 28.3 %
MAGNESIUM SERPL-MCNC: 1.88 MG/DL (ref 1.6–2.4)
MCH RBC QN AUTO: 31.1 PG (ref 26–34)
MCHC RBC AUTO-ENTMCNC: 33.4 G/DL (ref 32–36)
MCV RBC AUTO: 93 FL (ref 80–100)
MONOCYTES # BLD AUTO: 0.73 X10*3/UL (ref 0.1–1)
MONOCYTES NFR BLD AUTO: 7.4 %
NEUTROPHILS # BLD AUTO: 6.08 X10*3/UL (ref 1.2–7.7)
NEUTROPHILS NFR BLD AUTO: 61.3 %
NRBC BLD-RTO: 0 /100 WBCS (ref 0–0)
P AXIS: 106 DEGREES
P OFFSET: 197 MS
P ONSET: 148 MS
PLATELET # BLD AUTO: 245 X10*3/UL (ref 150–450)
POTASSIUM SERPL-SCNC: 3.6 MMOL/L (ref 3.5–5.3)
PR INTERVAL: 142 MS
PROT SERPL-MCNC: 6.5 G/DL (ref 6.4–8.2)
PROTHROMBIN TIME: 11.3 SECONDS (ref 9.8–12.8)
Q ONSET: 219 MS
QRS COUNT: 13 BEATS
QRS DURATION: 78 MS
QT INTERVAL: 398 MS
QTC CALCULATION(BAZETT): 447 MS
QTC FREDERICIA: 430 MS
R AXIS: 97 DEGREES
RBC # BLD AUTO: 5.15 X10*6/UL (ref 4.5–5.9)
SODIUM SERPL-SCNC: 136 MMOL/L (ref 136–145)
T AXIS: 72 DEGREES
T OFFSET: 418 MS
VENTRICULAR RATE: 76 BPM
WBC # BLD AUTO: 9.9 X10*3/UL (ref 4.4–11.3)

## 2024-11-01 PROCEDURE — 93005 ELECTROCARDIOGRAM TRACING: CPT

## 2024-11-01 PROCEDURE — 84484 ASSAY OF TROPONIN QUANT: CPT | Performed by: EMERGENCY MEDICINE

## 2024-11-01 PROCEDURE — 80053 COMPREHEN METABOLIC PANEL: CPT | Performed by: EMERGENCY MEDICINE

## 2024-11-01 PROCEDURE — 83735 ASSAY OF MAGNESIUM: CPT

## 2024-11-01 PROCEDURE — 85025 COMPLETE CBC W/AUTO DIFF WBC: CPT | Performed by: EMERGENCY MEDICINE

## 2024-11-01 PROCEDURE — 83880 ASSAY OF NATRIURETIC PEPTIDE: CPT | Performed by: EMERGENCY MEDICINE

## 2024-11-01 PROCEDURE — 99281 EMR DPT VST MAYX REQ PHY/QHP: CPT | Performed by: EMERGENCY MEDICINE

## 2024-11-01 PROCEDURE — 36415 COLL VENOUS BLD VENIPUNCTURE: CPT | Performed by: EMERGENCY MEDICINE

## 2024-11-01 PROCEDURE — 85610 PROTHROMBIN TIME: CPT | Performed by: EMERGENCY MEDICINE

## 2024-11-01 ASSESSMENT — PAIN SCALES - GENERAL
PAINLEVEL_OUTOF10: 7
PAINLEVEL_OUTOF10: 7

## 2024-11-01 ASSESSMENT — PAIN - FUNCTIONAL ASSESSMENT: PAIN_FUNCTIONAL_ASSESSMENT: 0-10

## 2024-11-01 ASSESSMENT — PAIN DESCRIPTION - DESCRIPTORS
DESCRIPTORS: THROBBING
DESCRIPTORS: THROBBING

## 2024-11-01 ASSESSMENT — PAIN DESCRIPTION - PROGRESSION: CLINICAL_PROGRESSION: NOT CHANGED

## 2024-11-01 ASSESSMENT — LIFESTYLE VARIABLES
EVER HAD A DRINK FIRST THING IN THE MORNING TO STEADY YOUR NERVES TO GET RID OF A HANGOVER: NO
EVER FELT BAD OR GUILTY ABOUT YOUR DRINKING: NO
TOTAL SCORE: 0
HAVE YOU EVER FELT YOU SHOULD CUT DOWN ON YOUR DRINKING: NO
HAVE PEOPLE ANNOYED YOU BY CRITICIZING YOUR DRINKING: NO

## 2024-11-01 ASSESSMENT — PAIN DESCRIPTION - ORIENTATION: ORIENTATION: MID

## 2024-11-01 ASSESSMENT — PAIN DESCRIPTION - LOCATION: LOCATION: CHEST

## 2024-11-01 ASSESSMENT — PAIN DESCRIPTION - FREQUENCY: FREQUENCY: CONSTANT/CONTINUOUS

## 2024-11-01 ASSESSMENT — PAIN DESCRIPTION - ONSET: ONSET: ONGOING

## 2024-11-01 ASSESSMENT — PAIN DESCRIPTION - PAIN TYPE: TYPE: ACUTE PAIN

## 2024-11-04 LAB
ATRIAL RATE: 80 BPM
P AXIS: 104 DEGREES
P OFFSET: 196 MS
P ONSET: 145 MS
PR INTERVAL: 152 MS
Q ONSET: 221 MS
QRS COUNT: 13 BEATS
QRS DURATION: 80 MS
QT INTERVAL: 416 MS
QTC CALCULATION(BAZETT): 479 MS
QTC FREDERICIA: 458 MS
R AXIS: 98 DEGREES
T AXIS: 69 DEGREES
T OFFSET: 429 MS
VENTRICULAR RATE: 80 BPM

## 2024-11-05 ENCOUNTER — APPOINTMENT (OUTPATIENT)
Dept: CARDIOLOGY | Facility: CLINIC | Age: 59
End: 2024-11-05
Payer: OTHER GOVERNMENT

## 2024-11-05 DIAGNOSIS — I25.10 CORONARY ARTERY DISEASE INVOLVING NATIVE CORONARY ARTERY OF NATIVE HEART WITHOUT ANGINA PECTORIS: Primary | ICD-10-CM

## 2024-11-05 DIAGNOSIS — Z95.0 CARDIAC PACEMAKER: ICD-10-CM

## 2024-11-05 DIAGNOSIS — Z95.2 HISTORY OF AORTIC VALVE REPLACEMENT: ICD-10-CM

## 2024-11-05 DIAGNOSIS — E78.5 DYSLIPIDEMIA: ICD-10-CM

## 2024-11-05 NOTE — PROGRESS NOTES
Chief Complaint:   No chief complaint on file.     History Of Present Illness:    Otis Connors is a 59 y.o. male with a history of CAD (PCI to RCA and CABG with VG to RCA at time of his AVR), AVR, dyslipidemia, chronic kidney disease, SSS s/p AAIR pacemaker, LEXIS intolerant of CPAP, and PTSD here for follow-up.        Medtronic pacemaker change out 7/29/2024    Catheterization 12/13/2023: LAD with proximal 30% stenosis.  LCx with 40% proximal stenosis.  RCA chronically occluded in the midsegment.  Left-to-right collaterals from the LAD.  Assumed VG to right PDA occluded.    Echocardiogram 11/9/2023: EF 50 to 55% per grade 1 diastolic dysfunction.  Moderately enlarged RV.  Normal functioning aortic valve prosthesis.  Mitral valve is moderately thickened.    Echocardiogram 12/18/2020: EF 50%.  Moderate concentric LVH.  Trace MR and trace to 1+ TR.  Normal functioning bioprosthetic aortic valve.    CABG 12/15/2020: VG to PDA.  AVR with #23 Inspiris Noonan bioprosthetic aortic valve     Catheterization 10/16/2020: Left main and LAD without significant disease. Circumflex with a long 60% eccentric stenosis in the proximal portion. RCA with 80% in-stent restenosis. Peak and mean gradients across the aortic valve 59 and 35 mmHg.      Allergies:  Ace inhibitors    Outpatient Medications:  Current Outpatient Medications   Medication Instructions    albuterol 90 mcg/actuation inhaler 2 puffs, inhalation, Every 4 hours PRN,       aspirin 81 mg, oral, Every morning    busPIRone (BUSPAR) 30 mg, oral, 2 times daily    cholecalciferol (VITAMIN D-3) 2,000 Units, oral, Every morning    esomeprazole (NEXIUM) 40 mg, oral, Daily PRN    ezetimibe (Zetia) 10 mg tablet 1 tablet, oral, Daily    FLUoxetine (PROZAC) 60 mg, oral, Every morning    furosemide (LASIX) 20 mg, oral, Daily PRN    LORazepam (ATIVAN) 1 mg, oral, Daily PRN    metoprolol tartrate (LOPRESSOR) 12.5 mg, oral, 2 times daily    multivitamin with minerals  (multivit-min-iron fum-folic ac) tablet 1 tablet, oral, Daily    nitroglycerin (Nitrostat) 0.4 mg SL tablet  DISSOLVE 1 TABLET UNDER THE TONGUE AS NEEDED FOR CHEST PAIN- MAY REPEAT EVERY 5 MINUTES IF NEEDED ( MAX 3 DOSES.- IF NO RELIEF CALL 911)      rosuvastatin (Crestor) 40 mg tablet 1 tablet, oral, Nightly       Last Recorded Vitals:  Visit Vitals  Smoking Status Former      LASTWT(3):   Wt Readings from Last 3 Encounters:   08/06/24 102 kg (224 lb)   08/03/24 99.3 kg (219 lb)   07/29/24 99.8 kg (220 lb)       Physical Exam:  In general: alert and in no acute distress.   HEENT: Carotid upstrokes normal with no bruits. JVP is normal.   Pulmonary: Clear to auscultation bilaterally.  Cardiovascular: S1, S2, regular. No appreciable murmurs, rubs or gallops.   Lower extremities: Warm. 2+ distal pulses. No edema.     Last Labs:  CBC -  Recent Labs     11/01/24  1304 08/03/24  1208 07/29/24  1049   WBC 9.9 7.5 8.6   HGB 16.0 16.1 15.3   HCT 47.9 49.6 47.1    236 208   MCV 93 96 95       CMP -  Recent Labs     11/01/24  1304 08/03/24  1208 07/29/24  1049 12/14/23  0624    138 137 137   K 3.6 4.3 4.3 3.7   CL 99 102 103 105   CO2 27 32 27 27   ANIONGAP 14 8* 11 9*   BUN 16 14 19 14   CREATININE 0.86 0.90 0.82 0.80   EGFR >90 >90 >90 >90   MG 1.88 2.00  --  1.83     Recent Labs     11/01/24  1304 08/03/24  1208 12/14/23  0624 12/13/23  1427   ALBUMIN 4.4 4.3 3.8 4.3   ALKPHOS 47 59  --  53   ALT 44 107*  --  46   AST 28 77*  --  36   BILITOT 0.9 0.5  --  0.7       LIPID PANEL -   Recent Labs     12/13/23  1555 02/10/21  0527 10/15/20  2305   CHOL 105 176 258*   LDLCALC 55  --   --    LDLF  --  101* 183*   HDL 30.5 37.0* 32.0*   TRIG 96 190* 215*       Recent Labs     11/01/24  1304 04/23/21  1027 11/13/20  1600 11/05/20  1321   BNP 60 69 42  --    HGBA1C  --   --   --  5.6           Assessment/Plan   1) CAD: PCI to the RCA in the past and CABG with VG to right PDA at time of his AVR.  Catheterization December  2023 reveals occluded vein graft to right PDA.  RCA with chronic total occlusion and left-to-right collateralization.    2) dyslipidemia:    3) single-chamber pacemaker: Follows with EP.    4) aortic valve replacement:    5) follow-up:      aZch Gomez MD

## 2024-11-08 ENCOUNTER — TELEPHONE (OUTPATIENT)
Dept: CARDIOLOGY | Facility: CLINIC | Age: 59
End: 2024-11-08
Payer: OTHER GOVERNMENT

## 2024-11-08 DIAGNOSIS — I25.10 CORONARY ARTERY DISEASE INVOLVING NATIVE CORONARY ARTERY OF NATIVE HEART WITHOUT ANGINA PECTORIS: ICD-10-CM

## 2024-11-08 DIAGNOSIS — I20.0 ANGINA PECTORIS, UNSTABLE (MULTI): Primary | ICD-10-CM

## 2024-11-08 RX ORDER — AMINOPHYLLINE 25 MG/ML
125 INJECTION, SOLUTION INTRAVENOUS ONCE AS NEEDED
OUTPATIENT
Start: 2024-11-08

## 2024-11-08 RX ORDER — REGADENOSON 0.08 MG/ML
0.4 INJECTION, SOLUTION INTRAVENOUS
OUTPATIENT
Start: 2024-11-08

## 2024-11-08 NOTE — TELEPHONE ENCOUNTER
Patient's wife called Dr. Gomez's office. Patient has been having intermittent chest pain for a little over a week. He went to [facility] ER 11/1, and he left AMA because they gunjan troponins, and left him in the waiting room for 3 hours per wife. He was seen in [place] ED on 11/4. CXR showed 3 fractured sternal wires from prior CABG, with one piece floating in the chest cavity per wife. He saw [facility] cardiac surgery yesterday, and they advised patient to see Dr. Gomez ASAP to r/o ACS before they do anything surgically.     Dr. Gomez notified, and he advised that patient complete nuclear stress test next week to r/o ACS. Once results are received, will forward them to [facility] cardiac surgeon that is treating patient. Spoke to patient's wife and scheduled nuclear stress test for 11/11 at [facility].    Pt's wife states he is becoming increasingly uncomfortable with chest pain, and is considering going back to ER. Advised her that if patient was to go back to ER at , [facility] would likely be the best option, as he can get a cardiac surgery consult there. She verbalized understanding, and will contact office with further concerns.

## 2024-11-11 ENCOUNTER — APPOINTMENT (OUTPATIENT)
Dept: RADIOLOGY | Facility: HOSPITAL | Age: 59
End: 2024-11-11
Payer: OTHER GOVERNMENT

## 2024-11-11 ENCOUNTER — TELEPHONE (OUTPATIENT)
Dept: CARDIOLOGY | Facility: CLINIC | Age: 59
End: 2024-11-11
Payer: OTHER GOVERNMENT

## 2024-11-11 ENCOUNTER — APPOINTMENT (OUTPATIENT)
Dept: CARDIOLOGY | Facility: HOSPITAL | Age: 59
End: 2024-11-11
Payer: OTHER GOVERNMENT

## 2024-11-11 NOTE — TELEPHONE ENCOUNTER
Patient's wife called 11/10 and left  on answering service to cancel nuclear stress test today 11/11. He is currently admitted at [facility], and will be having stress test there. Dr. Gomez notified.

## 2024-11-17 LAB
ATRIAL RATE: 76 BPM
P AXIS: 106 DEGREES
P OFFSET: 197 MS
P ONSET: 148 MS
PR INTERVAL: 142 MS
Q ONSET: 219 MS
QRS COUNT: 13 BEATS
QRS DURATION: 78 MS
QT INTERVAL: 398 MS
QTC CALCULATION(BAZETT): 447 MS
QTC FREDERICIA: 430 MS
R AXIS: 97 DEGREES
T AXIS: 72 DEGREES
T OFFSET: 418 MS
VENTRICULAR RATE: 76 BPM

## 2024-11-20 ENCOUNTER — APPOINTMENT (OUTPATIENT)
Dept: CARDIOLOGY | Facility: CLINIC | Age: 59
End: 2024-11-20
Payer: OTHER GOVERNMENT

## 2024-11-26 ENCOUNTER — TELEPHONE (OUTPATIENT)
Dept: CARDIOLOGY | Facility: CLINIC | Age: 59
End: 2024-11-26
Payer: OTHER GOVERNMENT

## 2024-11-26 DIAGNOSIS — I49.5 SSS (SICK SINUS SYNDROME) (MULTI): ICD-10-CM

## 2024-11-26 DIAGNOSIS — Z95.0 CARDIAC PACEMAKER: ICD-10-CM

## 2024-11-26 NOTE — TELEPHONE ENCOUNTER
Can you please place order for device check? Pt will be seeing  and device on 6/23/25, previous pt of Danny Kovacs. Thanks

## 2025-02-04 ENCOUNTER — APPOINTMENT (OUTPATIENT)
Dept: CARDIOLOGY | Facility: CLINIC | Age: 60
End: 2025-02-04
Payer: OTHER GOVERNMENT

## 2025-03-18 ENCOUNTER — APPOINTMENT (OUTPATIENT)
Dept: CARDIOLOGY | Facility: HOSPITAL | Age: 60
End: 2025-03-18
Payer: OTHER GOVERNMENT

## 2025-03-18 ENCOUNTER — APPOINTMENT (OUTPATIENT)
Dept: RADIOLOGY | Facility: HOSPITAL | Age: 60
End: 2025-03-18
Payer: OTHER GOVERNMENT

## 2025-03-18 ENCOUNTER — HOSPITAL ENCOUNTER (EMERGENCY)
Facility: HOSPITAL | Age: 60
Discharge: HOME | End: 2025-03-19
Attending: EMERGENCY MEDICINE
Payer: OTHER GOVERNMENT

## 2025-03-18 DIAGNOSIS — R07.9 CHEST PAIN, UNSPECIFIED TYPE: Primary | ICD-10-CM

## 2025-03-18 DIAGNOSIS — J18.9 PNEUMONIA DUE TO INFECTIOUS ORGANISM, UNSPECIFIED LATERALITY, UNSPECIFIED PART OF LUNG: ICD-10-CM

## 2025-03-18 LAB
ALBUMIN SERPL BCP-MCNC: 4.6 G/DL (ref 3.4–5)
ALP SERPL-CCNC: 51 U/L (ref 33–120)
ALT SERPL W P-5'-P-CCNC: 60 U/L (ref 10–52)
ANION GAP SERPL CALC-SCNC: 15 MMOL/L (ref 10–20)
AST SERPL W P-5'-P-CCNC: 44 U/L (ref 9–39)
BASOPHILS # BLD AUTO: 0.06 X10*3/UL (ref 0–0.1)
BASOPHILS NFR BLD AUTO: 0.4 %
BILIRUB SERPL-MCNC: 0.5 MG/DL (ref 0–1.2)
BNP SERPL-MCNC: 35 PG/ML (ref 0–99)
BUN SERPL-MCNC: 16 MG/DL (ref 6–23)
CALCIUM SERPL-MCNC: 9.5 MG/DL (ref 8.6–10.3)
CARDIAC TROPONIN I PNL SERPL HS: 3 NG/L (ref 0–20)
CARDIAC TROPONIN I PNL SERPL HS: 4 NG/L (ref 0–20)
CHLORIDE SERPL-SCNC: 101 MMOL/L (ref 98–107)
CO2 SERPL-SCNC: 24 MMOL/L (ref 21–32)
CREAT SERPL-MCNC: 0.91 MG/DL (ref 0.5–1.3)
EGFRCR SERPLBLD CKD-EPI 2021: >90 ML/MIN/1.73M*2
EOSINOPHIL # BLD AUTO: 0.31 X10*3/UL (ref 0–0.7)
EOSINOPHIL NFR BLD AUTO: 1.8 %
ERYTHROCYTE [DISTWIDTH] IN BLOOD BY AUTOMATED COUNT: 13 % (ref 11.5–14.5)
FLUAV RNA RESP QL NAA+PROBE: NOT DETECTED
FLUBV RNA RESP QL NAA+PROBE: NOT DETECTED
GLUCOSE SERPL-MCNC: 114 MG/DL (ref 74–99)
HCT VFR BLD AUTO: 46.1 % (ref 41–52)
HGB BLD-MCNC: 15.4 G/DL (ref 13.5–17.5)
HOLD SPECIMEN: NORMAL
IMM GRANULOCYTES # BLD AUTO: 0.05 X10*3/UL (ref 0–0.7)
IMM GRANULOCYTES NFR BLD AUTO: 0.3 % (ref 0–0.9)
LYMPHOCYTES # BLD AUTO: 2.85 X10*3/UL (ref 1.2–4.8)
LYMPHOCYTES NFR BLD AUTO: 16.7 %
MCH RBC QN AUTO: 30.9 PG (ref 26–34)
MCHC RBC AUTO-ENTMCNC: 33.4 G/DL (ref 32–36)
MCV RBC AUTO: 93 FL (ref 80–100)
MONOCYTES # BLD AUTO: 0.78 X10*3/UL (ref 0.1–1)
MONOCYTES NFR BLD AUTO: 4.6 %
NEUTROPHILS # BLD AUTO: 13.06 X10*3/UL (ref 1.2–7.7)
NEUTROPHILS NFR BLD AUTO: 76.2 %
NRBC BLD-RTO: 0 /100 WBCS (ref 0–0)
PLATELET # BLD AUTO: 261 X10*3/UL (ref 150–450)
POTASSIUM SERPL-SCNC: 3.5 MMOL/L (ref 3.5–5.3)
PROT SERPL-MCNC: 7.1 G/DL (ref 6.4–8.2)
RBC # BLD AUTO: 4.98 X10*6/UL (ref 4.5–5.9)
SARS-COV-2 RNA RESP QL NAA+PROBE: NOT DETECTED
SODIUM SERPL-SCNC: 136 MMOL/L (ref 136–145)
WBC # BLD AUTO: 17.1 X10*3/UL (ref 4.4–11.3)

## 2025-03-18 PROCEDURE — 94640 AIRWAY INHALATION TREATMENT: CPT

## 2025-03-18 PROCEDURE — 84484 ASSAY OF TROPONIN QUANT: CPT | Performed by: EMERGENCY MEDICINE

## 2025-03-18 PROCEDURE — 99285 EMERGENCY DEPT VISIT HI MDM: CPT | Mod: 25

## 2025-03-18 PROCEDURE — 36415 COLL VENOUS BLD VENIPUNCTURE: CPT | Performed by: EMERGENCY MEDICINE

## 2025-03-18 PROCEDURE — 87636 SARSCOV2 & INF A&B AMP PRB: CPT

## 2025-03-18 PROCEDURE — 93005 ELECTROCARDIOGRAM TRACING: CPT

## 2025-03-18 PROCEDURE — 2550000001 HC RX 255 CONTRASTS: Performed by: EMERGENCY MEDICINE

## 2025-03-18 PROCEDURE — 71275 CT ANGIOGRAPHY CHEST: CPT

## 2025-03-18 PROCEDURE — 74174 CTA ABD&PLVS W/CONTRAST: CPT | Performed by: RADIOLOGY

## 2025-03-18 PROCEDURE — 2500000002 HC RX 250 W HCPCS SELF ADMINISTERED DRUGS (ALT 637 FOR MEDICARE OP, ALT 636 FOR OP/ED)

## 2025-03-18 PROCEDURE — 80053 COMPREHEN METABOLIC PANEL: CPT | Performed by: EMERGENCY MEDICINE

## 2025-03-18 PROCEDURE — 85025 COMPLETE CBC W/AUTO DIFF WBC: CPT | Performed by: EMERGENCY MEDICINE

## 2025-03-18 PROCEDURE — 71275 CT ANGIOGRAPHY CHEST: CPT | Performed by: RADIOLOGY

## 2025-03-18 PROCEDURE — 83880 ASSAY OF NATRIURETIC PEPTIDE: CPT | Performed by: EMERGENCY MEDICINE

## 2025-03-18 RX ORDER — IPRATROPIUM BROMIDE AND ALBUTEROL SULFATE 2.5; .5 MG/3ML; MG/3ML
9 SOLUTION RESPIRATORY (INHALATION) ONCE
Status: COMPLETED | OUTPATIENT
Start: 2025-03-18 | End: 2025-03-18

## 2025-03-18 RX ORDER — KETOROLAC TROMETHAMINE 15 MG/ML
15 INJECTION, SOLUTION INTRAMUSCULAR; INTRAVENOUS ONCE
Status: COMPLETED | OUTPATIENT
Start: 2025-03-19 | End: 2025-03-19

## 2025-03-18 RX ADMIN — IOHEXOL 90 ML: 350 INJECTION, SOLUTION INTRAVENOUS at 22:04

## 2025-03-18 RX ADMIN — IPRATROPIUM BROMIDE AND ALBUTEROL SULFATE 9 ML: 2.5; .5 SOLUTION RESPIRATORY (INHALATION) at 22:38

## 2025-03-18 ASSESSMENT — PAIN SCALES - GENERAL
PAINLEVEL_OUTOF10: 7
PAINLEVEL_OUTOF10: 8

## 2025-03-18 ASSESSMENT — PAIN - FUNCTIONAL ASSESSMENT: PAIN_FUNCTIONAL_ASSESSMENT: 0-10

## 2025-03-18 ASSESSMENT — HEART SCORE
AGE: 45-64
HISTORY: SLIGHTLY SUSPICIOUS
ECG: NORMAL
TROPONIN: LESS THAN OR EQUAL TO NORMAL LIMIT
HEART SCORE: 3
RISK FACTORS: >2 RISK FACTORS OR HX OF ATHEROSCLEROTIC DISEASE

## 2025-03-18 ASSESSMENT — COLUMBIA-SUICIDE SEVERITY RATING SCALE - C-SSRS
1. IN THE PAST MONTH, HAVE YOU WISHED YOU WERE DEAD OR WISHED YOU COULD GO TO SLEEP AND NOT WAKE UP?: NO
6. HAVE YOU EVER DONE ANYTHING, STARTED TO DO ANYTHING, OR PREPARED TO DO ANYTHING TO END YOUR LIFE?: NO
2. HAVE YOU ACTUALLY HAD ANY THOUGHTS OF KILLING YOURSELF?: NO

## 2025-03-18 ASSESSMENT — PAIN DESCRIPTION - LOCATION: LOCATION: CHEST

## 2025-03-18 ASSESSMENT — PAIN DESCRIPTION - DESCRIPTORS: DESCRIPTORS: SHARP

## 2025-03-19 VITALS
BODY MASS INDEX: 32.96 KG/M2 | DIASTOLIC BLOOD PRESSURE: 65 MMHG | HEIGHT: 67 IN | OXYGEN SATURATION: 97 % | WEIGHT: 210 LBS | HEART RATE: 71 BPM | SYSTOLIC BLOOD PRESSURE: 96 MMHG | RESPIRATION RATE: 18 BRPM | TEMPERATURE: 98.1 F

## 2025-03-19 LAB
ATRIAL RATE: 74 BPM
ATRIAL RATE: 88 BPM
P AXIS: 76 DEGREES
P AXIS: 84 DEGREES
P OFFSET: 207 MS
P OFFSET: 208 MS
P ONSET: 141 MS
P ONSET: 150 MS
PR INTERVAL: 140 MS
PR INTERVAL: 164 MS
Q ONSET: 220 MS
Q ONSET: 221 MS
QRS COUNT: 12 BEATS
QRS COUNT: 14 BEATS
QRS DURATION: 80 MS
QRS DURATION: 82 MS
QT INTERVAL: 364 MS
QT INTERVAL: 424 MS
QTC CALCULATION(BAZETT): 440 MS
QTC CALCULATION(BAZETT): 470 MS
QTC FREDERICIA: 413 MS
QTC FREDERICIA: 455 MS
R AXIS: 57 DEGREES
R AXIS: 91 DEGREES
T AXIS: 65 DEGREES
T AXIS: 77 DEGREES
T OFFSET: 402 MS
T OFFSET: 433 MS
VENTRICULAR RATE: 74 BPM
VENTRICULAR RATE: 88 BPM

## 2025-03-19 PROCEDURE — 2500000004 HC RX 250 GENERAL PHARMACY W/ HCPCS (ALT 636 FOR OP/ED)

## 2025-03-19 PROCEDURE — 96374 THER/PROPH/DIAG INJ IV PUSH: CPT

## 2025-03-19 RX ORDER — ALBUTEROL SULFATE 90 UG/1
2 INHALANT RESPIRATORY (INHALATION) EVERY 4 HOURS PRN
Qty: 18 G | Refills: 0 | Status: SHIPPED | OUTPATIENT
Start: 2025-03-19 | End: 2025-04-18

## 2025-03-19 RX ORDER — AZITHROMYCIN 250 MG/1
250 TABLET, FILM COATED ORAL DAILY
Qty: 6 TABLET | Refills: 0 | Status: SHIPPED | OUTPATIENT
Start: 2025-03-19 | End: 2025-03-24

## 2025-03-19 RX ORDER — AMOXICILLIN AND CLAVULANATE POTASSIUM 875; 125 MG/1; MG/1
1 TABLET, FILM COATED ORAL EVERY 12 HOURS
Qty: 14 TABLET | Refills: 0 | Status: SHIPPED | OUTPATIENT
Start: 2025-03-19 | End: 2025-03-26

## 2025-03-19 RX ADMIN — KETOROLAC TROMETHAMINE 15 MG: 15 INJECTION, SOLUTION INTRAMUSCULAR; INTRAVENOUS at 00:31

## 2025-03-19 ASSESSMENT — PAIN SCALES - GENERAL: PAINLEVEL_OUTOF10: 6

## 2025-03-19 NOTE — DISCHARGE INSTRUCTIONS
Please take your medications as prescribed.  Follow-up with your cardiologist at your earliest convenience.  If you develop worsening chest pain, respiratory distress, or other worrisome symptoms, return to the ER.

## 2025-03-19 NOTE — ED PROVIDER NOTES
EMERGENCY DEPARTMENT ENCOUNTER      Pt Name: Otis Connors  MRN: [id number]  Birthdate 1965  Date of evaluation: 3/18/2025  Provider: Anuj Pimentel MD    CHIEF COMPLAINT       Chief Complaint   Patient presents with    Chest Pain     Pt endorses CP over the past week worsening rated @ 8/10 described as sharp (+)  SOB    Shortness of Breath         HISTORY OF PRESENT ILLNESS    HPI  Patient is a 59-year-old male with history of CAD s/p CABG, thoracic aortic aneurysm, Crohn's disease, HTN, HLD, sick sinus syndrome s/p pacemaker presenting with chest pain.  This been ongoing for the past 2 days.  It is left anterolateral chest, nonradiating, exacerbated with deep inspiration and coughing, without consistent alleviating factors.  He does note shortness of breath that is worse with exertion but not when lying flat.  He denies fever, sweats, chills, headache, visual changes, runny nose, congestion, sore throat, cough, abdominal pain, nausea, vomiting, dysuria, hematuria, black or bloody stools.    Nursing Notes were reviewed.    PAST MEDICAL HISTORY     Past Medical History:   Diagnosis Date    CAD (coronary artery disease)     CKD (chronic kidney disease)     Coronary artery disease involving autologous artery coronary bypass graft     Dyslipidemia     HTN (hypertension)     LEXIS (obstructive sleep apnea)     Pacemaker     PTSD (post-traumatic stress disorder)     SSS (sick sinus syndrome) (Multi)          SURGICAL HISTORY       Past Surgical History:   Procedure Laterality Date    CARDIAC CATHETERIZATION N/A 12/13/2023    Procedure: Left Heart Cath, No LV;  Surgeon: Zach Gomez MD;  Location: [facility] Cardiac Cath Lab;  Service: Cardiovascular;  Laterality: N/A;    CARDIAC CATHETERIZATION N/A 12/13/2023    Procedure: PCI;  Surgeon: Zach Gomez MD;  Location: [facility] Cardiac Cath Lab;  Service: Cardiovascular;  Laterality: N/A;    CARDIAC ELECTROPHYSIOLOGY PROCEDURE Left 7/29/2024    Procedure: PPM Generator  Change;  Surgeon: Leroy Delgado MD;  Location: [facility] Cardiac Cath Lab;  Service: Electrophysiology;  Laterality: Left;  Medtronic AAIR pacemaker generator replacement         CURRENT MEDICATIONS       Previous Medications    ASPIRIN 81 MG CHEWABLE TABLET    Chew 1 tablet (81 mg) once daily in the morning.    BUSPIRONE (BUSPAR) 15 MG TABLET    Take 2 tablets (30 mg) by mouth 2 times a day.    CHOLECALCIFEROL (VITAMIN D-3) 50 MCG (2,000 UNIT) CAPSULE    Take 1 capsule (50 mcg) by mouth once daily in the morning.    ESOMEPRAZOLE (NEXIUM) 40 MG DR CAPSULE    Take 1 capsule (40 mg) by mouth once daily as needed (heartburn).    EZETIMIBE (ZETIA) 10 MG TABLET    Take 1 tablet (10 mg) by mouth once daily.    FLUOXETINE (PROZAC) 20 MG CAPSULE    Take 3 capsules (60 mg) by mouth once daily in the morning.    FUROSEMIDE (LASIX) 20 MG TABLET    Take 1 tablet (20 mg) by mouth once daily as needed.    LORAZEPAM (ATIVAN) 1 MG TABLET    Take 1 tablet (1 mg) by mouth once daily as needed for anxiety.    METOPROLOL TARTRATE (LOPRESSOR) 25 MG TABLET    Take 0.5 tablets (12.5 mg) by mouth 2 times a day.    MULTIVITAMIN WITH MINERALS (MULTIVIT-MIN-IRON FUM-FOLIC AC) TABLET    Take 1 tablet by mouth once daily.    NITROGLYCERIN (NITROSTAT) 0.4 MG SL TABLET    DISSOLVE 1 TABLET UNDER THE TONGUE AS NEEDED FOR CHEST PAIN- MAY REPEAT EVERY 5 MINUTES IF NEEDED ( MAX 3 DOSES.- IF NO RELIEF CALL 911)    ROSUVASTATIN (CRESTOR) 40 MG TABLET    Take 1 tablet (40 mg) by mouth once daily at bedtime.       ALLERGIES     Ace inhibitors    FAMILY HISTORY       Family History   Problem Relation Name Age of Onset    Other (cardiac disorder) Father      Hypertension Father            SOCIAL HISTORY       Social History     Socioeconomic History    Marital status:    Tobacco Use    Smoking status: Former     Current packs/day: 1.50     Types: Cigarettes    Smokeless tobacco: Never   Vaping Use    Vaping status: Never Used   Substance and Sexual  Activity    Alcohol use: Not Currently    Drug use: Never    Sexual activity: Defer     Social Drivers of Health     Financial Resource Strain: Low Risk  (12/14/2023)    Overall Financial Resource Strain (CARDIA)     Difficulty of Paying Living Expenses: Not hard at all   Food Insecurity: No Food Insecurity (1/10/2025)    Received from [facility]    Hunger Vital Sign     Worried About Running Out of Food in the Last Year: Never true     Ran Out of Food in the Last Year: Never true   Transportation Needs: No Transportation Needs (1/10/2025)    Received from [facility]    PRAPARE - Transportation     Lack of Transportation (Medical): No     Lack of Transportation (Non-Medical): No   Housing Stability: Low Risk  (1/10/2025)    Received from [facility]    Housing Stability Vital Sign     Unable to Pay for Housing in the Last Year: No     Number of Times Moved in the Last Year: 0     Homeless in the Last Year: No       SCREENINGS                        PHYSICAL EXAM    (up to 7 for level 4, 8 or more for level 5)     ED Triage Vitals [03/18/25 1935]   Temperature Heart Rate Respirations BP   36.2 °C (97.2 °F) 81 16 130/79      Pulse Ox Temp src Heart Rate Source Patient Position   96 % -- -- --      BP Location FiO2 (%)     Right arm --       Physical Exam  Constitutional:       General: He is not in acute distress.     Appearance: He is not ill-appearing.   HENT:      Head: Normocephalic and atraumatic.   Eyes:      Extraocular Movements: Extraocular movements intact.      Pupils: Pupils are equal, round, and reactive to light.   Cardiovascular:      Rate and Rhythm: Normal rate and regular rhythm.      Heart sounds: Normal heart sounds.   Pulmonary:      Effort: Pulmonary effort is normal. No respiratory distress.      Comments: Mild expiratory wheeze in the right upper lobe  Abdominal:      Palpations: Abdomen is soft.   Musculoskeletal:      Cervical back: Normal range of motion and neck supple.       Right lower leg: No tenderness. No edema.      Left lower leg: No tenderness. No edema.   Skin:     General: Skin is warm and dry.   Neurological:      General: No focal deficit present.          DIAGNOSTIC RESULTS     LABS:  Labs Reviewed   CBC WITH AUTO DIFFERENTIAL - Abnormal       Result Value    WBC 17.1 (*)     nRBC 0.0      RBC 4.98      Hemoglobin 15.4      Hematocrit 46.1      MCV 93      MCH 30.9      MCHC 33.4      RDW 13.0      Platelets 261      Neutrophils % 76.2      Immature Granulocytes %, Automated 0.3      Lymphocytes % 16.7      Monocytes % 4.6      Eosinophils % 1.8      Basophils % 0.4      Neutrophils Absolute 13.06 (*)     Immature Granulocytes Absolute, Automated 0.05      Lymphocytes Absolute 2.85      Monocytes Absolute 0.78      Eosinophils Absolute 0.31      Basophils Absolute 0.06     COMPREHENSIVE METABOLIC PANEL - Abnormal    Glucose 114 (*)     Sodium 136      Potassium 3.5      Chloride 101      Bicarbonate 24      Anion Gap 15      Urea Nitrogen 16      Creatinine 0.91      eGFR >90      Calcium 9.5      Albumin 4.6      Alkaline Phosphatase 51      Total Protein 7.1      AST 44 (*)     Bilirubin, Total 0.5      ALT 60 (*)    B-TYPE NATRIURETIC PEPTIDE - Normal    BNP 35      Narrative:        <100 pg/mL - Heart failure unlikely  100-299 pg/mL - Intermediate probability of acute heart                  failure exacerbation. Correlate with clinical                  context and patient history.    >=300 pg/mL - Heart Failure likely. Correlate with clinical                  context and patient history.    BNP testing is performed using different testing methodology at [facility] than at other [facility] hospitals. Direct result comparisons should only be made within the same method.      SERIAL TROPONIN-INITIAL - Normal    Troponin I, High Sensitivity 3      Narrative:     Less than 99th percentile of normal range cutoff-  Female and children under 18 years old <14 ng/L;  Male <21 ng/L: Negative  Repeat testing should be performed if clinically indicated.     Female and children under 18 years old 14-50 ng/L; Male 21-50 ng/L:  Consistent with possible cardiac damage and possible increased clinical   risk. Serial measurements may help to assess extent of myocardial damage.     >50 ng/L: Consistent with cardiac damage, increased clinical risk and  myocardial infarction. Serial measurements may help assess extent of   myocardial damage.      NOTE: Children less than 1 year old may have higher baseline troponin   levels and results should be interpreted in conjunction with the overall   clinical context.     NOTE: Troponin I testing is performed using a different   testing methodology at [facility] than at other   [facility]. Direct result comparisons should only   be made within the same method.   SERIAL TROPONIN, 1 HOUR - Normal    Troponin I, High Sensitivity 4      Narrative:     Less than 99th percentile of normal range cutoff-  Female and children under 18 years old <14 ng/L; Male <21 ng/L: Negative  Repeat testing should be performed if clinically indicated.     Female and children under 18 years old 14-50 ng/L; Male 21-50 ng/L:  Consistent with possible cardiac damage and possible increased clinical   risk. Serial measurements may help to assess extent of myocardial damage.     >50 ng/L: Consistent with cardiac damage, increased clinical risk and  myocardial infarction. Serial measurements may help assess extent of   myocardial damage.      NOTE: Children less than 1 year old may have higher baseline troponin   levels and results should be interpreted in conjunction with the overall   clinical context.     NOTE: Troponin I testing is performed using a different   testing methodology at [facility] than at other   [facility]. Direct result comparisons should only   be made within the same method.   SARS-COV-2 AND INFLUENZA A/B PCR - Normal     Flu A Result Not Detected      Flu B Result Not Detected      Coronavirus 2019, PCR Not Detected      Narrative:     This assay is an FDA-cleared, in vitro diagnostic nucleic acid amplification test for the qualitative detection and differentiation of SARS CoV-2/ Influenza A/B from nasopharyngeal specimens collected from individuals with signs and symptoms of respiratory tract infections, and has been validated for use at [facility]. Negative results do not preclude COVID-19/ Influenza A/B infections and should not be used as the sole basis for diagnosis, treatment, or other management decisions. Testing for SARS CoV-2 is recommended only for patients who meet current clinical and/or epidemiological criteria defined by federal, state, or local public health directives.   TROPONIN SERIES- (INITIAL, 1 HR)    Narrative:     The following orders were created for panel order Troponin I Series, High Sensitivity (0, 1 HR).  Procedure                               Abnormality         Status                     ---------                               -----------         ------                     Troponin I, High Sensiti...[[id number]]  Normal              Final result               Troponin, High Sensitivi...[[id number]]  Normal              Final result                 Please view results for these tests on the individual orders.       All other labs were within normal range or not returned as of this dictation.    Imaging  CT angio chest abdomen pelvis   Final Result   No aortic dissection or evidence of acute aortic pathology.        Stable 4.4 cm ascending aortic aneurysm. Infrarenal aortic aneurysm   with moderate plaque/thrombus measuring up to 3 x 2.8 cm.        No acute abnormality of the chest, abdomen and pelvis.                  MACRO:   None        Signed by: Genesis Rivera 3/18/2025 10:56 PM   Dictation workstation:   [id number]           Procedures  Procedures     EMERGENCY DEPARTMENT  COURSE/MDM:     Diagnoses as of 03/19/25 0001   Chest pain, unspecified type   Pneumonia due to infectious organism, unspecified laterality, unspecified part of lung        Medical Decision Making  History obtained from the patient.  Records including labs, imaging, notes independently reviewed by me.  Presentation concerning for possible ACS, musculoskeletal pain, pneumonia, URI, pulmonary embolism, aortic dissection.  Cardiac labs are sent.  Troponin series normal and stable.  EKG without evidence of acute injury pattern.  Low suspicion for ACS at this time.  Virus was negative for COVID and flu.  CMP unremarkable.  CBC with leukocytosis 17.1.  CT angio of the chest, abdomen, pelvis showed a stable thoracic aortic aneurysm and no other acute cardiopulmonary pathology.  Patient with a heart score of 3.  Symptoms had improved after aerosols.  This is likely in the setting of bronchitis/pneumonitis/pneumonia.  Patient okay with being discharged home with close cardiac follow-up, antibiotics, albuterol inhaler.  All questions answered and return precautions discussed.    EKG demonstrates electronic atrial pacemaker at a rate of 88, normal intervals, Sgarbossa criteria negative    Patient and or family in agreement and understanding of treatment plan.  All questions answered.      I reviewed the case with the attending ED physician. The attending ED physician agrees with the plan. Patient and/or patient´s representative was counseled regarding labs, imaging, likely diagnosis, and plan. All questions were answered.    ED Medications administered this visit:    Medications   ketorolac (Toradol) injection 15 mg (has no administration in time range)   ipratropium-albuteroL (Duo-Neb) 0.5-2.5 mg/3 mL nebulizer solution 9 mL (9 mL nebulization Given 3/18/25 2238)   iohexol (OMNIPaque) 350 mg iodine/mL solution 90 mL (90 mL intravenous Given 3/18/25 2204)       New Prescriptions from this visit:    New Prescriptions     ALBUTEROL 90 MCG/ACTUATION INHALER    Inhale 2 puffs every 4 hours if needed for wheezing.    AMOXICILLIN-POT CLAVULANATE (AUGMENTIN) 875-125 MG TABLET    Take 1 tablet by mouth every 12 hours for 7 days.    AZITHROMYCIN (ZITHROMAX Z-KEVIN) 250 MG TABLET    Take 1 tablet (250 mg) by mouth once daily for 5 days. Take 2 tabs (500 mg) by mouth today, then take 1 tab daily for 4 days.       Follow-up:  Louie Alva MD  [address]  [facility] [address]  [phone]      As needed    Zach Gomez MD  [address]  [facility] [address]  [phone]    Schedule an appointment as soon as possible for a visit           Final Impression:   1. Chest pain, unspecified type    2. Pneumonia due to infectious organism, unspecified laterality, unspecified part of lung          (Please note that portions of this note were completed with a voice recognition program.  Efforts were made to edit the dictations but occasionally words are mis-transcribed.)     Anuj Pimentel MD  Resident  03/19/25 0002

## 2025-05-20 ENCOUNTER — APPOINTMENT (OUTPATIENT)
Dept: CARDIOLOGY | Facility: CLINIC | Age: 60
End: 2025-05-20
Payer: OTHER GOVERNMENT

## 2025-05-20 VITALS
HEIGHT: 67 IN | BODY MASS INDEX: 33.27 KG/M2 | HEART RATE: 77 BPM | SYSTOLIC BLOOD PRESSURE: 100 MMHG | OXYGEN SATURATION: 95 % | WEIGHT: 212 LBS | DIASTOLIC BLOOD PRESSURE: 66 MMHG

## 2025-05-20 DIAGNOSIS — Z95.0 CARDIAC PACEMAKER: Primary | ICD-10-CM

## 2025-05-20 DIAGNOSIS — Z95.2 PRESENCE OF PROSTHETIC HEART VALVE: ICD-10-CM

## 2025-05-20 DIAGNOSIS — I49.5 SSS (SICK SINUS SYNDROME) (MULTI): ICD-10-CM

## 2025-05-20 PROCEDURE — 3078F DIAST BP <80 MM HG: CPT | Performed by: INTERNAL MEDICINE

## 2025-05-20 PROCEDURE — 3008F BODY MASS INDEX DOCD: CPT | Performed by: INTERNAL MEDICINE

## 2025-05-20 PROCEDURE — 3074F SYST BP LT 130 MM HG: CPT | Performed by: INTERNAL MEDICINE

## 2025-05-20 PROCEDURE — 99214 OFFICE O/P EST MOD 30 MIN: CPT | Performed by: INTERNAL MEDICINE

## 2025-05-20 PROCEDURE — 1036F TOBACCO NON-USER: CPT | Performed by: INTERNAL MEDICINE

## 2025-05-20 RX ORDER — NITROGLYCERIN 0.4 MG/1
0.4 TABLET SUBLINGUAL EVERY 5 MIN PRN
Qty: 25 TABLET | Refills: 3 | Status: SHIPPED | OUTPATIENT
Start: 2025-05-20

## 2025-05-20 ASSESSMENT — PAIN SCALES - GENERAL: PAINLEVEL_OUTOF10: 0-NO PAIN

## 2025-05-20 NOTE — PROGRESS NOTES
Chief Complaint:   No chief complaint on file.     History Of Present Illness:    Otis Connors is a 60 y.o. male with a history of CAD s/p CABG with AVR at [facility], sick sinus syndrome status post single-lead atrial pacemaker, hypertension, dyslipidemia, prediabetes, chronic kidney disease, obstructive sleep apnea intolerant to CPAP, and PTSD here for follow-up.    Seeing GI for hernia surgery done 5/12/25 - follow up tomorrow.     Overall he is doing better.  Still has some pain on the left lateral chest but seems to be better.    Reviewed notes from Dr. Brewster regarding the patient's presentation and then eventual sternal plating.  Also reviewed ER notes from presentations on 3 different occasions from February 2025 to the current date.    Sternal plating 1/9/2025 at [facility].    Removal of fractured sternal wires 11/13/2024    Catheterization 12/13/2023: 30% proximal LAD and 40% proximal LCx disease.  RCA occluded in the mid vessel with left-to-right collaterals of the distal RCA.  Could not visualize the vein graft to the right PDA.    Echocardiogram 11/9/2023: EF 50 to 55%.  Moderately enlarged RV.  Bioprosthetic valve with normal function.  Moderately thickened mitral valve.     Echocardiogram 12/18/2020: EF 50% with mild global hypokinesis. Moderate concentric LVH. Trace MR and trace to 1+ TR. Normal functioning bioprosthetic valve     CABG 12/15/2020: VG to PDA and aortic valve replacement with a #23 Inspiris Noonan bioprosthetic aortic valve     Catheterization 10/16/2020: Left main and LAD without significant disease. Circumflex with a long 60% eccentric stenosis in the proximal portion. RCA with 80% in-stent restenosis. Peak and mean gradients across the aortic valve 59 and 35 mmHg.      Allergies:  Ace inhibitors    Outpatient Medications:  Current Outpatient Medications   Medication Instructions    albuterol 90 mcg/actuation inhaler 2 puffs, inhalation, Every 4 hours PRN     "aspirin 81 mg, Every morning    busPIRone (BUSPAR) 30 mg, 2 times daily    cholecalciferol (VITAMIN D-3) 2,000 Units, Every morning    esomeprazole (NEXIUM) 40 mg, Daily PRN    ezetimibe (Zetia) 10 mg tablet 1 tablet, Daily    FLUoxetine (PROZAC) 60 mg, Every morning    furosemide (LASIX) 20 mg, Daily PRN    LORazepam (ATIVAN) 1 mg, Daily PRN    metoprolol tartrate (LOPRESSOR) 12.5 mg, 2 times daily    multivitamin with minerals (multivit-min-iron fum-folic ac) tablet 1 tablet, Daily    nitroglycerin (Nitrostat) 0.4 mg SL tablet DISSOLVE 1 TABLET UNDER THE TONGUE AS NEEDED FOR CHEST PAIN- MAY REPEAT EVERY 5 MINUTES IF NEEDED ( MAX 3 DOSES.- IF NO RELIEF CALL 911)    rosuvastatin (Crestor) 40 mg tablet 1 tablet, Nightly       Last Recorded Vitals:  Visit Vitals  /66 (BP Location: Left arm, Patient Position: Sitting, BP Cuff Size: Adult)   Pulse 77   Ht 1.702 m (5' 7\")   Wt 96.2 kg (212 lb)   SpO2 95%   BMI 33.20 kg/m²   Smoking Status Former   BSA 2.13 m²      LASTWT(3):   Wt Readings from Last 3 Encounters:   05/20/25 96.2 kg (212 lb)   03/18/25 95.3 kg (210 lb)   08/06/24 102 kg (224 lb)       Physical Exam:  In general: alert and in no acute distress.   HEENT: Carotid upstrokes normal with no bruits. JVP is normal.   Pulmonary: Clear to auscultation bilaterally.  Cardiovascular: S1, S2, regular. No appreciable murmurs, rubs or gallops.   Lower extremities: Warm. 2+ distal pulses. No edema.     Last Labs:  CBC -  Recent Labs     03/18/25  1941 11/01/24  1304 08/03/24  1208   WBC 17.1* 9.9 7.5   HGB 15.4 16.0 16.1   HCT 46.1 47.9 49.6    245 236   MCV 93 93 96       CMP -  Recent Labs     03/18/25  1941 11/01/24  1304 08/03/24  1208 07/29/24  1049 12/14/23  0624    136 138   < > 137   K 3.5 3.6 4.3   < > 3.7    99 102   < > 105   CO2 24 27 32   < > 27   ANIONGAP 15 14 8*   < > 9*   BUN 16 16 14   < > 14   CREATININE 0.91 0.86 0.90   < > 0.80   EGFR >90 >90 >90   < > >90   MG  --  1.88 2.00  " --  1.83    < > = values in this interval not displayed.     Recent Labs     03/18/25  1941 11/01/24  1304 08/03/24  1208   ALBUMIN 4.6 4.4 4.3   ALKPHOS 51 47 59   ALT 60* 44 107*   AST 44* 28 77*   BILITOT 0.5 0.9 0.5       LIPID PANEL -   Recent Labs     12/13/23  1555 02/10/21  0527 10/15/20  2305   CHOL 105 176 258*   LDLCALC 55  --   --    LDLF  --  101* 183*   HDL 30.5 37.0* 32.0*   TRIG 96 190* 215*       Recent Labs     03/18/25  1941 12/30/24  1043 11/01/24  1304 04/23/21  1027 11/13/20  1600 11/05/20  1321   BNP 35  --  60 69   < >  --    HGBA1C  --  5.6  --   --   --  5.6    < > = values in this interval not displayed.           Assessment/Plan   1) CAD: CABG with VG to PDA 12/15/2020 along with his aortic valve replacement at the same time.  Prior PCI to the RCA.  Catheterization December 2023 revealed occluded native RCA.  Unable to cannulate the vein graft to the distal RCA.  There are faint collaterals from the left system to the distal right coronary.    No signs or symptoms to suggest ischemic coronary disease at this time.  Continue with medical therapy.    2) aortic valve replacement: 23 mm Inspiris Noonan bioprosthetic aortic valve placed 12/15/2020.  No evidence of severe valvular disease by exam.  Most recent echocardiogram with normal bioprosthetic valvular function.  Continue to observe.    3) chest pain: Because of a previous sternal wire infection the lower sternal wire was removed.  He had instability of the sternum at this point and underwent replating by Dr. Brewster January 2025.  Has had several ER visits since that time with recurring chest pain.  Seems to be improving.  No changes needed at this time.    4) dyslipidemia: LDL at goal of less than 70.  Continue rosuvastatin 40 mg daily.    5) follow-up: 1 year or sooner prn.    Zach Gomez MD

## 2025-05-27 DIAGNOSIS — I49.5 SSS (SICK SINUS SYNDROME) (MULTI): ICD-10-CM

## 2025-05-27 DIAGNOSIS — Z95.2 PRESENCE OF PROSTHETIC HEART VALVE: ICD-10-CM

## 2025-05-27 DIAGNOSIS — Z95.0 CARDIAC PACEMAKER: ICD-10-CM

## 2025-05-27 RX ORDER — NITROGLYCERIN 0.4 MG/1
0.4 TABLET SUBLINGUAL EVERY 5 MIN PRN
Qty: 25 TABLET | Refills: 3 | Status: SHIPPED | OUTPATIENT
Start: 2025-05-27

## 2025-06-23 ENCOUNTER — APPOINTMENT (OUTPATIENT)
Dept: CARDIOLOGY | Facility: CLINIC | Age: 60
End: 2025-06-23
Payer: OTHER GOVERNMENT

## 2025-06-27 ENCOUNTER — HOSPITAL ENCOUNTER (OUTPATIENT)
Facility: HOSPITAL | Age: 60
Setting detail: OBSERVATION
End: 2025-06-27
Attending: EMERGENCY MEDICINE | Admitting: STUDENT IN AN ORGANIZED HEALTH CARE EDUCATION/TRAINING PROGRAM
Payer: OTHER GOVERNMENT

## 2025-06-27 ENCOUNTER — APPOINTMENT (OUTPATIENT)
Dept: CARDIOLOGY | Facility: HOSPITAL | Age: 60
End: 2025-06-27
Payer: OTHER GOVERNMENT

## 2025-06-27 ENCOUNTER — APPOINTMENT (OUTPATIENT)
Dept: RADIOLOGY | Facility: HOSPITAL | Age: 60
End: 2025-06-27
Payer: OTHER GOVERNMENT

## 2025-06-27 DIAGNOSIS — I71.21 ANEURYSM OF ASCENDING AORTA WITHOUT RUPTURE: ICD-10-CM

## 2025-06-27 DIAGNOSIS — I25.112 CORONARY ARTERY DISEASE INVOLVING NATIVE CORONARY ARTERY OF NATIVE HEART WITH REFRACTORY ANGINA PECTORIS: ICD-10-CM

## 2025-06-27 DIAGNOSIS — Z95.0 CARDIAC PACEMAKER: ICD-10-CM

## 2025-06-27 DIAGNOSIS — R07.2 PRECORDIAL PAIN: ICD-10-CM

## 2025-06-27 DIAGNOSIS — I25.118 CORONARY ARTERY DISEASE INVOLVING NATIVE CORONARY ARTERY OF NATIVE HEART WITH OTHER FORM OF ANGINA PECTORIS: ICD-10-CM

## 2025-06-27 DIAGNOSIS — R07.9 CHEST PAIN, UNSPECIFIED TYPE: Primary | ICD-10-CM

## 2025-06-27 DIAGNOSIS — I49.5 SSS (SICK SINUS SYNDROME) (MULTI): ICD-10-CM

## 2025-06-27 DIAGNOSIS — I20.0 UNSTABLE ANGINA (MULTI): ICD-10-CM

## 2025-06-27 LAB
ALBUMIN SERPL BCP-MCNC: 4.3 G/DL (ref 3.4–5)
ALP SERPL-CCNC: 47 U/L (ref 33–136)
ALT SERPL W P-5'-P-CCNC: 31 U/L (ref 10–52)
ANION GAP SERPL CALC-SCNC: 13 MMOL/L (ref 10–20)
AST SERPL W P-5'-P-CCNC: 23 U/L (ref 9–39)
BASOPHILS # BLD AUTO: 0.05 X10*3/UL (ref 0–0.1)
BASOPHILS NFR BLD AUTO: 0.6 %
BILIRUB SERPL-MCNC: 0.7 MG/DL (ref 0–1.2)
BNP SERPL-MCNC: 29 PG/ML (ref 0–99)
BUN SERPL-MCNC: 13 MG/DL (ref 6–23)
CALCIUM SERPL-MCNC: 9.1 MG/DL (ref 8.6–10.3)
CARDIAC TROPONIN I PNL SERPL HS: 4 NG/L (ref 0–20)
CARDIAC TROPONIN I PNL SERPL HS: 4 NG/L (ref 0–20)
CHLORIDE SERPL-SCNC: 103 MMOL/L (ref 98–107)
CO2 SERPL-SCNC: 26 MMOL/L (ref 21–32)
CREAT SERPL-MCNC: 0.86 MG/DL (ref 0.5–1.3)
D DIMER PPP FEU-MCNC: 831 NG/ML FEU
EGFRCR SERPLBLD CKD-EPI 2021: >90 ML/MIN/1.73M*2
EOSINOPHIL # BLD AUTO: 0.19 X10*3/UL (ref 0–0.7)
EOSINOPHIL NFR BLD AUTO: 2.1 %
ERYTHROCYTE [DISTWIDTH] IN BLOOD BY AUTOMATED COUNT: 13.5 % (ref 11.5–14.5)
GLUCOSE SERPL-MCNC: 142 MG/DL (ref 74–99)
HCT VFR BLD AUTO: 44.6 % (ref 41–52)
HGB BLD-MCNC: 15 G/DL (ref 13.5–17.5)
HOLD SPECIMEN: NORMAL
IMM GRANULOCYTES # BLD AUTO: 0.03 X10*3/UL (ref 0–0.7)
IMM GRANULOCYTES NFR BLD AUTO: 0.3 % (ref 0–0.9)
LYMPHOCYTES # BLD AUTO: 1.94 X10*3/UL (ref 1.2–4.8)
LYMPHOCYTES NFR BLD AUTO: 21.4 %
MCH RBC QN AUTO: 31.3 PG (ref 26–34)
MCHC RBC AUTO-ENTMCNC: 33.6 G/DL (ref 32–36)
MCV RBC AUTO: 93 FL (ref 80–100)
MONOCYTES # BLD AUTO: 0.54 X10*3/UL (ref 0.1–1)
MONOCYTES NFR BLD AUTO: 5.9 %
NEUTROPHILS # BLD AUTO: 6.33 X10*3/UL (ref 1.2–7.7)
NEUTROPHILS NFR BLD AUTO: 69.7 %
NRBC BLD-RTO: 0 /100 WBCS (ref 0–0)
PLATELET # BLD AUTO: 215 X10*3/UL (ref 150–450)
POTASSIUM SERPL-SCNC: 3.6 MMOL/L (ref 3.5–5.3)
PROT SERPL-MCNC: 6.2 G/DL (ref 6.4–8.2)
RBC # BLD AUTO: 4.79 X10*6/UL (ref 4.5–5.9)
SODIUM SERPL-SCNC: 138 MMOL/L (ref 136–145)
WBC # BLD AUTO: 9.1 X10*3/UL (ref 4.4–11.3)

## 2025-06-27 PROCEDURE — 99285 EMERGENCY DEPT VISIT HI MDM: CPT | Performed by: PHYSICIAN ASSISTANT

## 2025-06-27 PROCEDURE — 85025 COMPLETE CBC W/AUTO DIFF WBC: CPT | Performed by: EMERGENCY MEDICINE

## 2025-06-27 PROCEDURE — 2500000005 HC RX 250 GENERAL PHARMACY W/O HCPCS: Performed by: STUDENT IN AN ORGANIZED HEALTH CARE EDUCATION/TRAINING PROGRAM

## 2025-06-27 PROCEDURE — 2500000001 HC RX 250 WO HCPCS SELF ADMINISTERED DRUGS (ALT 637 FOR MEDICARE OP): Performed by: PHYSICIAN ASSISTANT

## 2025-06-27 PROCEDURE — G0378 HOSPITAL OBSERVATION PER HR: HCPCS

## 2025-06-27 PROCEDURE — 84484 ASSAY OF TROPONIN QUANT: CPT | Performed by: EMERGENCY MEDICINE

## 2025-06-27 PROCEDURE — 2500000001 HC RX 250 WO HCPCS SELF ADMINISTERED DRUGS (ALT 637 FOR MEDICARE OP): Performed by: STUDENT IN AN ORGANIZED HEALTH CARE EDUCATION/TRAINING PROGRAM

## 2025-06-27 PROCEDURE — 96375 TX/PRO/DX INJ NEW DRUG ADDON: CPT

## 2025-06-27 PROCEDURE — 85379 FIBRIN DEGRADATION QUANT: CPT | Performed by: PHYSICIAN ASSISTANT

## 2025-06-27 PROCEDURE — 80053 COMPREHEN METABOLIC PANEL: CPT | Performed by: EMERGENCY MEDICINE

## 2025-06-27 PROCEDURE — 71046 X-RAY EXAM CHEST 2 VIEWS: CPT

## 2025-06-27 PROCEDURE — 93005 ELECTROCARDIOGRAM TRACING: CPT

## 2025-06-27 PROCEDURE — 84484 ASSAY OF TROPONIN QUANT: CPT | Performed by: STUDENT IN AN ORGANIZED HEALTH CARE EDUCATION/TRAINING PROGRAM

## 2025-06-27 PROCEDURE — 96372 THER/PROPH/DIAG INJ SC/IM: CPT | Performed by: STUDENT IN AN ORGANIZED HEALTH CARE EDUCATION/TRAINING PROGRAM

## 2025-06-27 PROCEDURE — 80053 COMPREHEN METABOLIC PANEL: CPT | Performed by: STUDENT IN AN ORGANIZED HEALTH CARE EDUCATION/TRAINING PROGRAM

## 2025-06-27 PROCEDURE — 2500000004 HC RX 250 GENERAL PHARMACY W/ HCPCS (ALT 636 FOR OP/ED): Performed by: PHYSICIAN ASSISTANT

## 2025-06-27 PROCEDURE — 83880 ASSAY OF NATRIURETIC PEPTIDE: CPT | Performed by: STUDENT IN AN ORGANIZED HEALTH CARE EDUCATION/TRAINING PROGRAM

## 2025-06-27 PROCEDURE — 96374 THER/PROPH/DIAG INJ IV PUSH: CPT | Mod: 59

## 2025-06-27 PROCEDURE — 99222 1ST HOSP IP/OBS MODERATE 55: CPT | Performed by: STUDENT IN AN ORGANIZED HEALTH CARE EDUCATION/TRAINING PROGRAM

## 2025-06-27 PROCEDURE — 85025 COMPLETE CBC W/AUTO DIFF WBC: CPT | Performed by: STUDENT IN AN ORGANIZED HEALTH CARE EDUCATION/TRAINING PROGRAM

## 2025-06-27 PROCEDURE — 36415 COLL VENOUS BLD VENIPUNCTURE: CPT | Performed by: STUDENT IN AN ORGANIZED HEALTH CARE EDUCATION/TRAINING PROGRAM

## 2025-06-27 PROCEDURE — 99285 EMERGENCY DEPT VISIT HI MDM: CPT | Mod: 25 | Performed by: EMERGENCY MEDICINE

## 2025-06-27 PROCEDURE — 71046 X-RAY EXAM CHEST 2 VIEWS: CPT | Performed by: RADIOLOGY

## 2025-06-27 PROCEDURE — 2500000004 HC RX 250 GENERAL PHARMACY W/ HCPCS (ALT 636 FOR OP/ED): Performed by: STUDENT IN AN ORGANIZED HEALTH CARE EDUCATION/TRAINING PROGRAM

## 2025-06-27 PROCEDURE — 83880 ASSAY OF NATRIURETIC PEPTIDE: CPT | Performed by: EMERGENCY MEDICINE

## 2025-06-27 RX ORDER — NAPROXEN SODIUM 220 MG/1
324 TABLET, FILM COATED ORAL ONCE
Status: COMPLETED | OUTPATIENT
Start: 2025-06-27 | End: 2025-06-27

## 2025-06-27 RX ORDER — ACETAMINOPHEN 160 MG/5ML
650 SOLUTION ORAL EVERY 4 HOURS PRN
Status: ACTIVE | OUTPATIENT
Start: 2025-06-27

## 2025-06-27 RX ORDER — ONDANSETRON HYDROCHLORIDE 2 MG/ML
4 INJECTION, SOLUTION INTRAVENOUS EVERY 8 HOURS PRN
Status: ACTIVE | OUTPATIENT
Start: 2025-06-27

## 2025-06-27 RX ORDER — FORMOTEROL FUMARATE 20 UG/2ML
20 SOLUTION RESPIRATORY (INHALATION)
Status: DISCONTINUED | OUTPATIENT
Start: 2025-06-27 | End: 2025-06-28

## 2025-06-27 RX ORDER — ACETAMINOPHEN 650 MG/1
650 SUPPOSITORY RECTAL EVERY 4 HOURS PRN
Status: ACTIVE | OUTPATIENT
Start: 2025-06-27

## 2025-06-27 RX ORDER — ONDANSETRON HYDROCHLORIDE 2 MG/ML
4 INJECTION, SOLUTION INTRAVENOUS ONCE
Status: COMPLETED | OUTPATIENT
Start: 2025-06-27 | End: 2025-06-27

## 2025-06-27 RX ORDER — ONDANSETRON 4 MG/1
4 TABLET, FILM COATED ORAL EVERY 8 HOURS PRN
Status: ACTIVE | OUTPATIENT
Start: 2025-06-27

## 2025-06-27 RX ORDER — ENOXAPARIN SODIUM 100 MG/ML
40 INJECTION SUBCUTANEOUS EVERY 24 HOURS
Status: DISPENSED | OUTPATIENT
Start: 2025-06-27

## 2025-06-27 RX ORDER — EZETIMIBE 10 MG/1
10 TABLET ORAL DAILY
Status: DISPENSED | OUTPATIENT
Start: 2025-06-28

## 2025-06-27 RX ORDER — PANTOPRAZOLE SODIUM 40 MG/1
40 TABLET, DELAYED RELEASE ORAL
Status: DISPENSED | OUTPATIENT
Start: 2025-06-28

## 2025-06-27 RX ORDER — BUSPIRONE HYDROCHLORIDE 15 MG/1
30 TABLET ORAL 2 TIMES DAILY
Status: DISPENSED | OUTPATIENT
Start: 2025-06-27

## 2025-06-27 RX ORDER — NAPROXEN SODIUM 220 MG/1
81 TABLET, FILM COATED ORAL EVERY MORNING
Status: DISPENSED | OUTPATIENT
Start: 2025-06-28

## 2025-06-27 RX ORDER — TALC
3 POWDER (GRAM) TOPICAL NIGHTLY
Status: DISPENSED | OUTPATIENT
Start: 2025-06-27

## 2025-06-27 RX ORDER — FLUOXETINE 20 MG/1
60 CAPSULE ORAL EVERY MORNING
Status: DISPENSED | OUTPATIENT
Start: 2025-06-28

## 2025-06-27 RX ORDER — ACETAMINOPHEN 325 MG/1
650 TABLET ORAL EVERY 4 HOURS PRN
Status: ACTIVE | OUTPATIENT
Start: 2025-06-27

## 2025-06-27 RX ORDER — IBUPROFEN 600 MG/1
600 TABLET, FILM COATED ORAL ONCE
Status: COMPLETED | OUTPATIENT
Start: 2025-06-27 | End: 2025-06-27

## 2025-06-27 RX ORDER — ALUMINUM HYDROXIDE, MAGNESIUM HYDROXIDE, AND SIMETHICONE 1200; 120; 1200 MG/30ML; MG/30ML; MG/30ML
20 SUSPENSION ORAL ONCE
Status: COMPLETED | OUTPATIENT
Start: 2025-06-27 | End: 2025-06-27

## 2025-06-27 RX ORDER — ATORVASTATIN CALCIUM 80 MG/1
80 TABLET, FILM COATED ORAL NIGHTLY
Status: DISPENSED | OUTPATIENT
Start: 2025-06-27

## 2025-06-27 RX ORDER — KETOROLAC TROMETHAMINE 15 MG/ML
15 INJECTION, SOLUTION INTRAMUSCULAR; INTRAVENOUS ONCE
Status: DISCONTINUED | OUTPATIENT
Start: 2025-06-27 | End: 2025-06-27

## 2025-06-27 RX ORDER — MORPHINE SULFATE 4 MG/ML
4 INJECTION, SOLUTION INTRAMUSCULAR; INTRAVENOUS ONCE
Status: COMPLETED | OUTPATIENT
Start: 2025-06-27 | End: 2025-06-27

## 2025-06-27 RX ORDER — METOPROLOL TARTRATE 25 MG/1
12.5 TABLET, FILM COATED ORAL 2 TIMES DAILY
Status: DISPENSED | OUTPATIENT
Start: 2025-06-27

## 2025-06-27 RX ADMIN — MORPHINE SULFATE 4 MG: 4 INJECTION, SOLUTION INTRAMUSCULAR; INTRAVENOUS at 20:52

## 2025-06-27 RX ADMIN — ONDANSETRON 4 MG: 2 INJECTION INTRAMUSCULAR; INTRAVENOUS at 20:52

## 2025-06-27 RX ADMIN — ATORVASTATIN CALCIUM 80 MG: 80 TABLET, FILM COATED ORAL at 22:41

## 2025-06-27 RX ADMIN — ENOXAPARIN SODIUM 40 MG: 100 INJECTION SUBCUTANEOUS at 22:41

## 2025-06-27 RX ADMIN — ASPIRIN 81 MG CHEWABLE TABLET 324 MG: 81 TABLET CHEWABLE at 22:42

## 2025-06-27 RX ADMIN — IBUPROFEN 600 MG: 600 TABLET ORAL at 19:24

## 2025-06-27 RX ADMIN — BUSPIRONE HYDROCHLORIDE 30 MG: 15 TABLET ORAL at 22:41

## 2025-06-27 RX ADMIN — MELATONIN TAB 3 MG 3 MG: 3 TAB at 22:41

## 2025-06-27 RX ADMIN — ALUMINUM HYDROXIDE, MAGNESIUM HYDROXIDE, AND DIMETHICONE 20 ML: 200; 20; 200 SUSPENSION ORAL at 19:25

## 2025-06-27 RX ADMIN — METOPROLOL TARTRATE 12.5 MG: 25 TABLET, FILM COATED ORAL at 22:41

## 2025-06-27 SDOH — HEALTH STABILITY: MENTAL HEALTH: HOW OFTEN DO YOU HAVE A DRINK CONTAINING ALCOHOL?: NEVER

## 2025-06-27 SDOH — ECONOMIC STABILITY: HOUSING INSECURITY: IN THE PAST 12 MONTHS, HOW MANY TIMES HAVE YOU MOVED WHERE YOU WERE LIVING?: 0

## 2025-06-27 SDOH — HEALTH STABILITY: MENTAL HEALTH: HOW OFTEN DO YOU HAVE SIX OR MORE DRINKS ON ONE OCCASION?: NEVER

## 2025-06-27 SDOH — HEALTH STABILITY: MENTAL HEALTH: HOW MANY DRINKS CONTAINING ALCOHOL DO YOU HAVE ON A TYPICAL DAY WHEN YOU ARE DRINKING?: PATIENT DOES NOT DRINK

## 2025-06-27 SDOH — ECONOMIC STABILITY: FOOD INSECURITY: WITHIN THE PAST 12 MONTHS, THE FOOD YOU BOUGHT JUST DIDN'T LAST AND YOU DIDN'T HAVE MONEY TO GET MORE.: NEVER TRUE

## 2025-06-27 SDOH — ECONOMIC STABILITY: HOUSING INSECURITY: IN THE LAST 12 MONTHS, WAS THERE A TIME WHEN YOU WERE NOT ABLE TO PAY THE MORTGAGE OR RENT ON TIME?: NO

## 2025-06-27 SDOH — ECONOMIC STABILITY: HOUSING INSECURITY: AT ANY TIME IN THE PAST 12 MONTHS, WERE YOU HOMELESS OR LIVING IN A SHELTER (INCLUDING NOW)?: NO

## 2025-06-27 SDOH — SOCIAL STABILITY: SOCIAL INSECURITY: HAVE YOU HAD THOUGHTS OF HARMING ANYONE ELSE?: NO

## 2025-06-27 SDOH — ECONOMIC STABILITY: INCOME INSECURITY: IN THE PAST 12 MONTHS HAS THE ELECTRIC, GAS, OIL, OR WATER COMPANY THREATENED TO SHUT OFF SERVICES IN YOUR HOME?: NO

## 2025-06-27 SDOH — SOCIAL STABILITY: SOCIAL INSECURITY: WITHIN THE LAST YEAR, HAVE YOU BEEN AFRAID OF YOUR PARTNER OR EX-PARTNER?: NO

## 2025-06-27 SDOH — SOCIAL STABILITY: SOCIAL INSECURITY
WITHIN THE LAST YEAR, HAVE YOU BEEN KICKED, HIT, SLAPPED, OR OTHERWISE PHYSICALLY HURT BY YOUR PARTNER OR EX-PARTNER?: NO

## 2025-06-27 SDOH — SOCIAL STABILITY: SOCIAL INSECURITY: ARE THERE ANY APPARENT SIGNS OF INJURIES/BEHAVIORS THAT COULD BE RELATED TO ABUSE/NEGLECT?: NO

## 2025-06-27 SDOH — ECONOMIC STABILITY: FOOD INSECURITY: WITHIN THE PAST 12 MONTHS, YOU WORRIED THAT YOUR FOOD WOULD RUN OUT BEFORE YOU GOT THE MONEY TO BUY MORE.: NEVER TRUE

## 2025-06-27 SDOH — SOCIAL STABILITY: SOCIAL INSECURITY: WITHIN THE LAST YEAR, HAVE YOU BEEN HUMILIATED OR EMOTIONALLY ABUSED IN OTHER WAYS BY YOUR PARTNER OR EX-PARTNER?: NO

## 2025-06-27 SDOH — SOCIAL STABILITY: SOCIAL INSECURITY
WITHIN THE LAST YEAR, HAVE YOU BEEN RAPED OR FORCED TO HAVE ANY KIND OF SEXUAL ACTIVITY BY YOUR PARTNER OR EX-PARTNER?: NO

## 2025-06-27 SDOH — SOCIAL STABILITY: SOCIAL INSECURITY: ARE YOU OR HAVE YOU BEEN THREATENED OR ABUSED PHYSICALLY, EMOTIONALLY, OR SEXUALLY BY ANYONE?: NO

## 2025-06-27 SDOH — ECONOMIC STABILITY: TRANSPORTATION INSECURITY: IN THE PAST 12 MONTHS, HAS LACK OF TRANSPORTATION KEPT YOU FROM MEDICAL APPOINTMENTS OR FROM GETTING MEDICATIONS?: NO

## 2025-06-27 SDOH — SOCIAL STABILITY: SOCIAL INSECURITY: HAS ANYONE EVER THREATENED TO HURT YOUR FAMILY OR YOUR PETS?: NO

## 2025-06-27 SDOH — SOCIAL STABILITY: SOCIAL INSECURITY: ABUSE: ADULT

## 2025-06-27 SDOH — ECONOMIC STABILITY: FOOD INSECURITY: HOW HARD IS IT FOR YOU TO PAY FOR THE VERY BASICS LIKE FOOD, HOUSING, MEDICAL CARE, AND HEATING?: NOT HARD AT ALL

## 2025-06-27 SDOH — SOCIAL STABILITY: SOCIAL INSECURITY: DO YOU FEEL UNSAFE GOING BACK TO THE PLACE WHERE YOU ARE LIVING?: NO

## 2025-06-27 SDOH — SOCIAL STABILITY: SOCIAL INSECURITY: WERE YOU ABLE TO COMPLETE ALL THE BEHAVIORAL HEALTH SCREENINGS?: YES

## 2025-06-27 SDOH — SOCIAL STABILITY: SOCIAL INSECURITY: DOES ANYONE TRY TO KEEP YOU FROM HAVING/CONTACTING OTHER FRIENDS OR DOING THINGS OUTSIDE YOUR HOME?: NO

## 2025-06-27 SDOH — SOCIAL STABILITY: SOCIAL INSECURITY: HAVE YOU HAD ANY THOUGHTS OF HARMING ANYONE ELSE?: NO

## 2025-06-27 SDOH — SOCIAL STABILITY: SOCIAL INSECURITY: DO YOU FEEL ANYONE HAS EXPLOITED OR TAKEN ADVANTAGE OF YOU FINANCIALLY OR OF YOUR PERSONAL PROPERTY?: NO

## 2025-06-27 ASSESSMENT — PAIN DESCRIPTION - PROGRESSION: CLINICAL_PROGRESSION: GRADUALLY WORSENING

## 2025-06-27 ASSESSMENT — PAIN SCALES - GENERAL
PAINLEVEL_OUTOF10: 4
PAINLEVEL_OUTOF10: 7
PAINLEVEL_OUTOF10: 8

## 2025-06-27 ASSESSMENT — PAIN - FUNCTIONAL ASSESSMENT
PAIN_FUNCTIONAL_ASSESSMENT: 0-10
PAIN_FUNCTIONAL_ASSESSMENT: 0-10

## 2025-06-27 ASSESSMENT — ACTIVITIES OF DAILY LIVING (ADL)
FEEDING YOURSELF: INDEPENDENT
ADEQUATE_TO_COMPLETE_ADL: YES
JUDGMENT_ADEQUATE_SAFELY_COMPLETE_DAILY_ACTIVITIES: YES
WALKS IN HOME: INDEPENDENT
LACK_OF_TRANSPORTATION: NO
BATHING: INDEPENDENT
TOILETING: INDEPENDENT
DRESSING YOURSELF: INDEPENDENT
GROOMING: INDEPENDENT
PATIENT'S MEMORY ADEQUATE TO SAFELY COMPLETE DAILY ACTIVITIES?: YES
HEARING - RIGHT EAR: FUNCTIONAL
HEARING - LEFT EAR: FUNCTIONAL

## 2025-06-27 ASSESSMENT — HEART SCORE
HEART SCORE: 3
TROPONIN: LESS THAN OR EQUAL TO NORMAL LIMIT
HISTORY: SLIGHTLY SUSPICIOUS
RISK FACTORS: >2 RISK FACTORS OR HX OF ATHEROSCLEROTIC DISEASE
ECG: NORMAL
AGE: 45-64

## 2025-06-27 ASSESSMENT — COGNITIVE AND FUNCTIONAL STATUS - GENERAL
PATIENT BASELINE BEDBOUND: NO
MOBILITY SCORE: 24
DAILY ACTIVITIY SCORE: 24

## 2025-06-27 ASSESSMENT — LIFESTYLE VARIABLES
HAVE PEOPLE ANNOYED YOU BY CRITICIZING YOUR DRINKING: NO
TOTAL SCORE: 0
EVER FELT BAD OR GUILTY ABOUT YOUR DRINKING: NO
HAVE YOU EVER FELT YOU SHOULD CUT DOWN ON YOUR DRINKING: NO
AUDIT-C TOTAL SCORE: 0
SKIP TO QUESTIONS 9-10: 1
EVER HAD A DRINK FIRST THING IN THE MORNING TO STEADY YOUR NERVES TO GET RID OF A HANGOVER: NO

## 2025-06-27 ASSESSMENT — PAIN DESCRIPTION - PAIN TYPE: TYPE: ACUTE PAIN

## 2025-06-27 ASSESSMENT — PAIN DESCRIPTION - ORIENTATION
ORIENTATION: MID
ORIENTATION: MID

## 2025-06-27 ASSESSMENT — PAIN DESCRIPTION - FREQUENCY: FREQUENCY: CONSTANT/CONTINUOUS

## 2025-06-27 ASSESSMENT — PAIN DESCRIPTION - ONSET: ONSET: ONGOING

## 2025-06-27 ASSESSMENT — PAIN DESCRIPTION - DESCRIPTORS
DESCRIPTORS: SQUEEZING;SHARP
DESCRIPTORS: ACHING
DESCRIPTORS: SHARP;PRESSURE

## 2025-06-27 ASSESSMENT — PAIN DESCRIPTION - LOCATION
LOCATION: CHEST
LOCATION: CHEST

## 2025-06-27 NOTE — ED PROVIDER NOTES
HPI   Chief Complaint   Patient presents with    Chest Pain     Patient has CP since yesterday. Mid sternal, that radiated up the left jaw and arm (yesterday) Patient went to [place] ED but left because of the wait. Patient has a significant cardiac history.       HPI  Patient is a 60-year-old male with a past medical history of CAD s/p CABG with AVR at [facility], sick sinus syndrome status post single-lead atrial pacemaker, hypertension, dyslipidemia, prediabetes, chronic kidney disease, obstructive sleep apnea intolerant to CPAP, and PTSD who presents to the ED for evaluation of chest pain.  Patient reports yesterday around 6 PM he was sitting in his chair when he started having midsternal chest pressure/stabbing that is nonradiating and nonexertional.  No orthopnea.  He does not feel short of breath but reports sometimes he does not feel like he can take a full breath.  He is not having any URI symptoms.  No fevers, chills, nausea, vomiting, diarrhea, lightheadedness, dizziness, headache, vision changes.  He occasionally smokes cigars and is a previous smoker.  He is compliant with his daily aspirin.  Reports this feels very similar to when he had chest pain before his CABG.  He is following with cardiology Dr. Gomez.  Denies any falls or injuries.      Patient History   Medical History[1]  Surgical History[2]  Family History[3]  Social History[4]    Physical Exam   ED Triage Vitals [06/27/25 1339]   Temperature Heart Rate Respirations BP   36.9 °C (98.4 °F) 72 16 117/72      Pulse Ox Temp Source Heart Rate Source Patient Position   95 % Temporal Monitor Standing      BP Location FiO2 (%)     Right arm --       Physical Exam      ED Course & [facility]   ED Course as of 06/27/25 2107 Fri Jun 27, 2025   1408 EKG performed at 13: 37 and independently reviewed by provider: Reveals NSR with a rate of 73 bpm, normal axis, normal intervals, no ST changes, no T wave abnormalities, no ectopy. No STEMI. [TL]   1536  EKG performed at 15: 30 and independently reviewed by provider: Reveals NSR with a rate of 81 bpm, normal axis, normal intervals, no ST changes, no T wave abnormalities, no ectopy. No STEMI.  Sinus arrhythmia present. [TL]   1851 XR chest 2 views  IMPRESSION:  No acute cardiopulmonary process.       [HK]   2020 D-Dimer, Quantitative VTE Exclusion(!): 831  Years rule out [HK]      ED Course User Index  [HK] Jose Antonio Muro PA-C  [TL] Irwin Shoemaker DO         Diagnoses as of 06/27/25 2107   Chest pain, unspecified type                 No data recorded     Martin Coma Scale Score: 15 (06/27/25 1855 : Adelina Cancino RN) HEART Score: 3 (06/27/25 1905 : Michele Douglas DO)                         Medical Decision Making  Patient is a 60-year-old male with a past medical history of CAD s/p CABG with AVR at [facility], sick sinus syndrome status post single-lead atrial pacemaker, hypertension, dyslipidemia, prediabetes, chronic kidney disease, obstructive sleep apnea intolerant to CPAP, and PTSD who presents to the ED for evaluation of chest pain.  On exam patient is well-appearing and in no acute distress.  Vital signs are stable.  Cardiopulmonary exam largely unremarkable.  Chest pain is not reproducible on palpation.  PERRLA.  EOM intact.  Differential includes but is not limited to ACS, PE, pneumonia, MSK pain, arrhythmia, electrolyte abnormality, CHF.  Doubt aortic pathology.  Patient staffed with ED attending physician.    I did review labs obtained yesterday where he had leukocytosis at 18.  Unclear why this is normalized today.    CBC without leukocytosis or anemia.  CMP with glucose 142.  No electrolyte abnormality.  Normal renal function.  Troponins 4, 4.  ECG nonischemic.  BNP 29.  Patient is not clinically fluid overloaded.  D-dimer is negative per years criteria.  Pain treated in ED with ibuprofen 600 mg p.o. and Mylanta.  No improvement after this so he was escalated to morphine 4 mg IV and  Zofran 4 mg IV.  Most recent echocardiogram completed in 2023 is with an EF of 50 to 55%, moderate enlarged right ventricle, mild aortic valve stenosis, mitral valve moderately thickened.  He did not go to his stress test that was scheduled 6 months ago. Given patient's history and persistent chest pain he warrants an admission for cardiac evaluation.  As a result of this workup patient will be admitted.     --------------------------------------------------------------------------------------------------------------------------------------------------------------------------------------------------------------------------    This note was dictated using a speech recognition program.  While an attempt was made at proof reading to minimize errors, minor errors in transcription may be present call for questions.     Procedure  Procedures       [1]   Past Medical History:  Diagnosis Date    CAD (coronary artery disease)     CKD (chronic kidney disease)     Coronary artery disease involving autologous artery coronary bypass graft     Dyslipidemia     HTN (hypertension)     LEXIS (obstructive sleep apnea)     Pacemaker     PTSD (post-traumatic stress disorder)     SSS (sick sinus syndrome) (Multi)    [2]   Past Surgical History:  Procedure Laterality Date    CARDIAC CATHETERIZATION N/A 12/13/2023    Procedure: Left Heart Cath, No LV;  Surgeon: Zach Gomez MD;  Location: [facility] Cardiac Cath Lab;  Service: Cardiovascular;  Laterality: N/A;    CARDIAC CATHETERIZATION N/A 12/13/2023    Procedure: PCI;  Surgeon: Zach Gomez MD;  Location: [facility] Cardiac Cath Lab;  Service: Cardiovascular;  Laterality: N/A;    CARDIAC ELECTROPHYSIOLOGY PROCEDURE Left 7/29/2024    Procedure: PPM Generator Change;  Surgeon: Leroy Delgado MD;  Location: [facility] Cardiac Cath Lab;  Service: Electrophysiology;  Laterality: Left;  Medtronic AAIR pacemaker generator replacement   [3]   Family History  Problem Relation Name Age of Onset    Other  (cardiac disorder) Father      Hypertension Father     [4]   Social History  Tobacco Use    Smoking status: Former     Current packs/day: 1.50     Types: Cigarettes    Smokeless tobacco: Never    Tobacco comments:     Occasionally will use cigars   Vaping Use    Vaping status: Never Used   Substance Use Topics    Alcohol use: Not Currently    Drug use: Never        Jose Antonio Muro PA-C  06/27/25 8699

## 2025-06-27 NOTE — ED TRIAGE NOTES
This patient was seen and examined in triage    HPI:  Patient is a healthy nontoxic-appearing 60-year-old male with a past medical history of CAD, dyslipidemia, cardiac pacemaker, bicuspid aortic valve, Crohn's disease, congestive heart failure, presents to the emergency room today for complaint of chest discomfort.  Patient is symptoms have been ongoing for the past day.  Patient states chest pain is midsternal in alleviating or worsening factors and has been constant.  Patient states does a history of extensive cardiac disease and wanted to be evaluated.  Patient complains of some mild shortness of breath however denies any abdominal pain, nausea vomit, diarrhea constipation, fever, shaking, or chills.    Focused PE:  Gen: Well-appearing, not in acute distress  Cardiovascular: Regular rate, normal rhythm, no murmur, no gallop  Respiratory: No adventitious lung sounds auscultated.  Abdomen: No reproducible abdominal tenderness upon palpation,  physical exam may be limited by patient positioning sitting up in a chair  Neuro:  Alert and Oriented, speech clear and coherent    Plan:  Lab work was ordered from triage including EKG and chest x-ray.    For the remainder the patient's work-up and ED course, please see the main ED provider note.  We discussed need for diagnostic testing including lab studies and imaging.  We also discussed that they may be asked to wait in the waiting room while these test are pending.  They understand that if they choose to leave without having the testing completed or resulted that we cannot rule out acute life-threatening illnesses and the risks involved to lead to worsening condition, permanent disability or even death.

## 2025-06-27 NOTE — Clinical Note
Single view of the saphenous vein graft to the right coronary artery obtained using hand injection. Unable to view, completely occluded

## 2025-06-27 NOTE — Clinical Note
Accessed site: right femoral artery.   Ultrasound guidance was used. 4 FR Micropuncture was used and up sized to 5 FR sheath

## 2025-06-28 ENCOUNTER — APPOINTMENT (OUTPATIENT)
Dept: CARDIOLOGY | Facility: HOSPITAL | Age: 60
End: 2025-06-28
Payer: OTHER GOVERNMENT

## 2025-06-28 LAB
ATRIAL RATE: 73 BPM
ATRIAL RATE: 81 BPM
BODY SURFACE AREA: 2.04 M2
P AXIS: 4 DEGREES
P AXIS: 62 DEGREES
P OFFSET: 199 MS
P OFFSET: 201 MS
P ONSET: 141 MS
P ONSET: 153 MS
PR INTERVAL: 122 MS
PR INTERVAL: 146 MS
Q ONSET: 214 MS
Q ONSET: 216 MS
QRS COUNT: 12 BEATS
QRS COUNT: 14 BEATS
QRS DURATION: 84 MS
QRS DURATION: 90 MS
QT INTERVAL: 394 MS
QT INTERVAL: 402 MS
QTC CALCULATION(BAZETT): 442 MS
QTC CALCULATION(BAZETT): 457 MS
QTC FREDERICIA: 429 MS
QTC FREDERICIA: 435 MS
R AXIS: 50 DEGREES
R AXIS: 50 DEGREES
T AXIS: 48 DEGREES
T AXIS: 61 DEGREES
T OFFSET: 413 MS
T OFFSET: 415 MS
VENTRICULAR RATE: 73 BPM
VENTRICULAR RATE: 81 BPM

## 2025-06-28 PROCEDURE — 2500000005 HC RX 250 GENERAL PHARMACY W/O HCPCS: Performed by: STUDENT IN AN ORGANIZED HEALTH CARE EDUCATION/TRAINING PROGRAM

## 2025-06-28 PROCEDURE — 2500000001 HC RX 250 WO HCPCS SELF ADMINISTERED DRUGS (ALT 637 FOR MEDICARE OP): Performed by: STUDENT IN AN ORGANIZED HEALTH CARE EDUCATION/TRAINING PROGRAM

## 2025-06-28 PROCEDURE — 93306 TTE W/DOPPLER COMPLETE: CPT | Performed by: INTERNAL MEDICINE

## 2025-06-28 PROCEDURE — 2500000004 HC RX 250 GENERAL PHARMACY W/ HCPCS (ALT 636 FOR OP/ED): Performed by: STUDENT IN AN ORGANIZED HEALTH CARE EDUCATION/TRAINING PROGRAM

## 2025-06-28 PROCEDURE — 96375 TX/PRO/DX INJ NEW DRUG ADDON: CPT | Mod: 59

## 2025-06-28 PROCEDURE — 99232 SBSQ HOSP IP/OBS MODERATE 35: CPT

## 2025-06-28 PROCEDURE — 2500000002 HC RX 250 W HCPCS SELF ADMINISTERED DRUGS (ALT 637 FOR MEDICARE OP, ALT 636 FOR OP/ED): Performed by: STUDENT IN AN ORGANIZED HEALTH CARE EDUCATION/TRAINING PROGRAM

## 2025-06-28 PROCEDURE — 96376 TX/PRO/DX INJ SAME DRUG ADON: CPT | Mod: 59

## 2025-06-28 PROCEDURE — 96372 THER/PROPH/DIAG INJ SC/IM: CPT | Performed by: STUDENT IN AN ORGANIZED HEALTH CARE EDUCATION/TRAINING PROGRAM

## 2025-06-28 PROCEDURE — G0378 HOSPITAL OBSERVATION PER HR: HCPCS

## 2025-06-28 PROCEDURE — 93306 TTE W/DOPPLER COMPLETE: CPT

## 2025-06-28 RX ORDER — FORMOTEROL FUMARATE 20 UG/2ML
20 SOLUTION RESPIRATORY (INHALATION) 2 TIMES DAILY PRN
Status: ACTIVE | OUTPATIENT
Start: 2025-06-28

## 2025-06-28 RX ORDER — LORAZEPAM 1 MG/1
1 TABLET ORAL ONCE
Status: COMPLETED | OUTPATIENT
Start: 2025-06-28 | End: 2025-06-28

## 2025-06-28 RX ADMIN — HYDROMORPHONE HYDROCHLORIDE 0.5 MG: 1 INJECTION, SOLUTION INTRAMUSCULAR; INTRAVENOUS; SUBCUTANEOUS at 20:20

## 2025-06-28 RX ADMIN — PANTOPRAZOLE SODIUM 40 MG: 40 TABLET, DELAYED RELEASE ORAL at 05:57

## 2025-06-28 RX ADMIN — METOPROLOL TARTRATE 12.5 MG: 25 TABLET, FILM COATED ORAL at 20:20

## 2025-06-28 RX ADMIN — LORAZEPAM 1 MG: 1 TABLET ORAL at 21:34

## 2025-06-28 RX ADMIN — HYDROMORPHONE HYDROCHLORIDE 0.5 MG: 1 INJECTION, SOLUTION INTRAMUSCULAR; INTRAVENOUS; SUBCUTANEOUS at 05:27

## 2025-06-28 RX ADMIN — ASPIRIN 81 MG CHEWABLE TABLET 81 MG: 81 TABLET CHEWABLE at 08:25

## 2025-06-28 RX ADMIN — ATORVASTATIN CALCIUM 80 MG: 80 TABLET, FILM COATED ORAL at 20:20

## 2025-06-28 RX ADMIN — METOPROLOL TARTRATE 12.5 MG: 25 TABLET, FILM COATED ORAL at 08:26

## 2025-06-28 RX ADMIN — FLUOXETINE HYDROCHLORIDE 60 MG: 20 CAPSULE ORAL at 08:26

## 2025-06-28 RX ADMIN — EZETIMIBE 10 MG: 10 TABLET ORAL at 08:26

## 2025-06-28 RX ADMIN — BUSPIRONE HYDROCHLORIDE 30 MG: 15 TABLET ORAL at 20:20

## 2025-06-28 RX ADMIN — MELATONIN TAB 3 MG 3 MG: 3 TAB at 20:20

## 2025-06-28 RX ADMIN — HYDROMORPHONE HYDROCHLORIDE 0.5 MG: 1 INJECTION, SOLUTION INTRAMUSCULAR; INTRAVENOUS; SUBCUTANEOUS at 09:49

## 2025-06-28 RX ADMIN — HYDROMORPHONE HYDROCHLORIDE 0.5 MG: 1 INJECTION, SOLUTION INTRAMUSCULAR; INTRAVENOUS; SUBCUTANEOUS at 01:06

## 2025-06-28 RX ADMIN — HYDROMORPHONE HYDROCHLORIDE 0.5 MG: 1 INJECTION, SOLUTION INTRAMUSCULAR; INTRAVENOUS; SUBCUTANEOUS at 15:14

## 2025-06-28 RX ADMIN — BUSPIRONE HYDROCHLORIDE 30 MG: 15 TABLET ORAL at 08:25

## 2025-06-28 RX ADMIN — ENOXAPARIN SODIUM 40 MG: 100 INJECTION SUBCUTANEOUS at 22:54

## 2025-06-28 ASSESSMENT — PAIN - FUNCTIONAL ASSESSMENT
PAIN_FUNCTIONAL_ASSESSMENT: 0-10

## 2025-06-28 ASSESSMENT — PAIN DESCRIPTION - ORIENTATION
ORIENTATION: MID

## 2025-06-28 ASSESSMENT — COGNITIVE AND FUNCTIONAL STATUS - GENERAL
DAILY ACTIVITIY SCORE: 24
MOBILITY SCORE: 24

## 2025-06-28 ASSESSMENT — PAIN DESCRIPTION - LOCATION
LOCATION: CHEST

## 2025-06-28 ASSESSMENT — PAIN DESCRIPTION - DESCRIPTORS
DESCRIPTORS: ACHING

## 2025-06-28 ASSESSMENT — PAIN SCALES - GENERAL
PAINLEVEL_OUTOF10: 4
PAINLEVEL_OUTOF10: 2
PAINLEVEL_OUTOF10: 2
PAINLEVEL_OUTOF10: 4
PAINLEVEL_OUTOF10: 6
PAINLEVEL_OUTOF10: 3
PAINLEVEL_OUTOF10: 7
PAINLEVEL_OUTOF10: 7
PAINLEVEL_OUTOF10: 6
PAINLEVEL_OUTOF10: 6

## 2025-06-28 NOTE — PROGRESS NOTES
Otis Connors is a 60 y.o. male on day 0 of admission presenting with Chest pain.      Subjective   Seen and examined at bedside. CLARA. Has no new complaints.  Still having chest pain that he says is constant and described as a dull pressure and intermittently it will radiate to his jaw and neck.  Says it feels similar to when he had his prior MI.  Troponins negative and no acute changes on EKG.  Chest pain is not reproducible on exam.  He denies fever, chills, night sweats.  Denies associated symptoms with the chest pain of diaphoresis, shortness of breath, palpitations or presyncope.       Objective     Last Recorded Vitals  /80 (BP Location: Left arm, Patient Position: Sitting)   Pulse 70   Temp 35.6 °C (96.1 °F) (Temporal)   Resp 18   Wt 87.8 kg (193 lb 9 oz)   SpO2 97%   Intake/Output last 3 Shifts:  No intake or output data in the 24 hours ending 06/28/25 1307    Admission Weight  Weight: 95.3 kg (210 lb) (06/27/25 1339)    Daily Weight  06/27/25 : 87.8 kg (193 lb 9 oz)    Image Results  ECG 12 lead  Normal sinus rhythm  Normal ECG  When compared with ECG of 18-MAR-2025 21:59,  Sinus rhythm has replaced Electronic atrial pacemaker  ECG 12 lead  Atrial-paced rhythm with occasional sinus complexes  Abnormal ECG  When compared with ECG of 27-JUN-2025 13:37, (unconfirmed)  Electronic atrial pacemaker has replaced Sinus rhythm      Physical Exam  Constitutional: A&Ox4, NAD, resting comfortable   Head and Face: Atraumatic, normocephalic   Eyes: Normal external exam, EOMI  ENT: Normal external inspection of ears and nose. Oropharynx normal.  Cardiovascular: RRR, S1/S2, no murmurs, rubs, or gallops, radial pulses +2, healed sternotomy scar on exam, chest wall nontender to palpation  Pulmonary: CTAB, no respiratory distress, no wheezing, rales or rhonchi, on RA  Abdomen: +BS, soft, non-tender, nondistended, no guarding rigidity or rebound tenderness, no masses noted  MSK: Negative for edema, No joint  swelling, normal movements of all extremities.   Neuro: No focal deficits, normal motor function, normal sensation, follows all commands  Skin- No lesions, contusions, or erythema.  Psychiatric: Judgment intact. Appropriate mood, affect and behavior   Relevant Results                              Assessment & Plan  Chest pain    nonexertional, nonpositional chest pain not relieved by nitroglycerin that has been somewhat chronic over the last 6 months following sternal plating  w/ concurrent repair of subxiphoid incisional hernia in January of this year  Will give full dose aspirin, 81 daily, atorvastatin cardiology consulted, formal evaluation pending  - Echocardiogram to assess for wall motion abnormalities  - Cardiology consulted appreciate recommendations    IVF: None  DVT Ppx: Lovenox  Diet: cardiac  Code Status: FULL CODE    Complexity moderate: anticipate DC in next 24 hours pending Echo and cards recommendations              Dave Riggs DO

## 2025-06-28 NOTE — H&P
"History Of Present Illness  Otis Connors is a 60 y.o. male w/ PMH CAD s/p CABG with AVR at [facility] c/b sternal wire infection s/p sternal wire removal and sternal plating 1/2025 w/ concurrent repair of subxiphoid incisional hernia, sick sinus syndrome status post single-lead atrial pacemaker, hypertension, dyslipidemia, prediabetes, chronic kidney disease, obstructive sleep apnea intolerant to CPAP, and PTSD     He presents with chest pain that began yesterday, described as 7 out of 10, central, nonexertional, nonpositional without any alleviating or exacerbating factors.  No improvement with nitro at home.  He is a multiple ED visits for chest pain in the past year, he recently saw his cardiologist in May for chest pain, did not feel it was cardiac related at that time.  Also followed up with his surgeon earlier this month that repaired his incisional hernia who felt his pain was related to scar tissue.      ED course  Normotensive, afebrile  Laboratory analysis unremarkable  Dimer 831 however low pretest probability for pulmonary embolism and below thousand therefore no CT PE was performed  EKG without acute ischemic changes    Admitted for evaluation of chest pain    Code Status: Full code     Medical History[1]  Surgical History[2]  Family History[3]  Social History[4]     Allergies  Ace inhibitors      Physical Exam  Constitutional: Awake/alert/oriented x3  Eyes: Clear sclera  Head/Neck: Normocephalic, atraumatic  Respiratory/Thorax: CTAB  Cardiovascular: RRR  Gastrointestinal: Non-TTP  Musculoskeletal: FROM  Extremities: No clubbing  Psychological: Appropriate mood and behavior  Skin: Warm and dry, no jaundice     Last Recorded Vitals  Blood pressure 131/85, pulse 73, temperature 36 °C (96.8 °F), temperature source Temporal, resp. rate 16, height 1.702 m (5' 7\"), weight 95.3 kg (210 lb), SpO2 98%.    Relevant Results    Results for orders placed during the hospital encounter of 06/27/25    XR " chest 2 views    Narrative  Interpreted By:  Mehrdad Elias,  STUDY:  XR CHEST 2 VIEWS;  6/27/2025 6:10 pm    INDICATION:  Signs/Symptoms:chest pain.    COMPARISON:  Chest x-ray/03/2024    ACCESSION NUMBER(S):  [id number]    ORDERING CLINICIAN:  SHELBI CARDENAS    FINDINGS:  Midline sternotomy wires and sternal fixation plate and screws noted.  Left-sided single lead pacer is present. Residual epicardial leads  suspected.    CARDIOMEDIASTINAL SILHOUETTE:  Cardiomediastinal silhouette is normal in size and configuration.  Atherosclerotic calcification of the aorta.    LUNGS:  No consolidation, pleural effusion or pneumothorax.    ABDOMEN:  No remarkable upper abdominal findings.    BONES:  No acute osseous abnormality.    Impression  No acute cardiopulmonary process.    MACRO:  None    Signed by: Mehrdad Elias 6/27/2025 6:20 PM  Dictation workstation:   [id number]      Assessment/Plan    Assessment & Plan  Chest pain     nonexertional, nonpositional chest pain not relieved by nitroglycerin that has been somewhat chronic over the last 6 months following sternal plating  w/ concurrent repair of subxiphoid incisional hernia in January of this year   Will give full dose aspirin, 81 daily, atorvastatin cardiology consulted, formal evaluation pending        Dispo: Pending PT/OT eval. LOS > 2 MN    Code Status: Full Code    Plan of care was discussed extensively with patient. Patient verbalized understanding through teach back method. All questions and concerns addressed upon examination.   Sagar Lima DO  Complexity: Moderate  ###Of note, this documentation is completed using the Dragon Dictation system (voice recognition software). There may be spelling and/or grammatical errors that were not corrected prior to final submission.**         [1]   Past Medical History:  Diagnosis Date    CAD (coronary artery disease)     CKD (chronic kidney disease)     Coronary artery disease involving autologous artery coronary bypass  graft     Dyslipidemia     HTN (hypertension)     LEXIS (obstructive sleep apnea)     Pacemaker     PTSD (post-traumatic stress disorder)     SSS (sick sinus syndrome) (Multi)    [2]   Past Surgical History:  Procedure Laterality Date    CARDIAC CATHETERIZATION N/A 12/13/2023    Procedure: Left Heart Cath, No LV;  Surgeon: Zach Gomez MD;  Location: [facility] Cardiac Cath Lab;  Service: Cardiovascular;  Laterality: N/A;    CARDIAC CATHETERIZATION N/A 12/13/2023    Procedure: PCI;  Surgeon: Zach Gomez MD;  Location: [facility] Cardiac Cath Lab;  Service: Cardiovascular;  Laterality: N/A;    CARDIAC ELECTROPHYSIOLOGY PROCEDURE Left 7/29/2024    Procedure: PPM Generator Change;  Surgeon: Leroy Delgado MD;  Location: [facility] Cardiac Cath Lab;  Service: Electrophysiology;  Laterality: Left;  Medtronic AAIR pacemaker generator replacement   [3]   Family History  Problem Relation Name Age of Onset    Other (cardiac disorder) Father      Hypertension Father     [4]   Social History  Tobacco Use    Smoking status: Former     Current packs/day: 1.50     Types: Cigarettes    Smokeless tobacco: Never    Tobacco comments:     Occasionally will use cigars   Vaping Use    Vaping status: Never Used   Substance Use Topics    Alcohol use: Not Currently    Drug use: Never

## 2025-06-29 VITALS
HEART RATE: 74 BPM | WEIGHT: 193.56 LBS | OXYGEN SATURATION: 94 % | SYSTOLIC BLOOD PRESSURE: 105 MMHG | RESPIRATION RATE: 16 BRPM | HEIGHT: 67 IN | DIASTOLIC BLOOD PRESSURE: 67 MMHG | TEMPERATURE: 98.2 F | BODY MASS INDEX: 30.38 KG/M2

## 2025-06-29 LAB
ALBUMIN SERPL BCP-MCNC: 3.9 G/DL (ref 3.4–5)
ANION GAP SERPL CALC-SCNC: 11 MMOL/L (ref 10–20)
BASOPHILS # BLD AUTO: 0.04 X10*3/UL (ref 0–0.1)
BASOPHILS NFR BLD AUTO: 0.4 %
BUN SERPL-MCNC: 14 MG/DL (ref 6–23)
CALCIUM SERPL-MCNC: 8.1 MG/DL (ref 8.6–10.3)
CARDIAC TROPONIN I PNL SERPL HS: 4 NG/L (ref 0–20)
CHLORIDE SERPL-SCNC: 104 MMOL/L (ref 98–107)
CO2 SERPL-SCNC: 25 MMOL/L (ref 21–32)
CREAT SERPL-MCNC: 0.72 MG/DL (ref 0.5–1.3)
EGFRCR SERPLBLD CKD-EPI 2021: >90 ML/MIN/1.73M*2
EOSINOPHIL # BLD AUTO: 0.25 X10*3/UL (ref 0–0.7)
EOSINOPHIL NFR BLD AUTO: 2.3 %
ERYTHROCYTE [DISTWIDTH] IN BLOOD BY AUTOMATED COUNT: 13.7 % (ref 11.5–14.5)
GLUCOSE SERPL-MCNC: 114 MG/DL (ref 74–99)
HCT VFR BLD AUTO: 45 % (ref 41–52)
HGB BLD-MCNC: 14.6 G/DL (ref 13.5–17.5)
IMM GRANULOCYTES # BLD AUTO: 0.04 X10*3/UL (ref 0–0.7)
IMM GRANULOCYTES NFR BLD AUTO: 0.4 % (ref 0–0.9)
LYMPHOCYTES # BLD AUTO: 1.63 X10*3/UL (ref 1.2–4.8)
LYMPHOCYTES NFR BLD AUTO: 15 %
MCH RBC QN AUTO: 31 PG (ref 26–34)
MCHC RBC AUTO-ENTMCNC: 32.4 G/DL (ref 32–36)
MCV RBC AUTO: 96 FL (ref 80–100)
MONOCYTES # BLD AUTO: 0.63 X10*3/UL (ref 0.1–1)
MONOCYTES NFR BLD AUTO: 5.8 %
NEUTROPHILS # BLD AUTO: 8.26 X10*3/UL (ref 1.2–7.7)
NEUTROPHILS NFR BLD AUTO: 76.1 %
NRBC BLD-RTO: 0 /100 WBCS (ref 0–0)
PHOSPHATE SERPL-MCNC: 2.8 MG/DL (ref 2.5–4.9)
PLATELET # BLD AUTO: 208 X10*3/UL (ref 150–450)
POTASSIUM SERPL-SCNC: 4.1 MMOL/L (ref 3.5–5.3)
RBC # BLD AUTO: 4.71 X10*6/UL (ref 4.5–5.9)
SODIUM SERPL-SCNC: 136 MMOL/L (ref 136–145)
WBC # BLD AUTO: 10.9 X10*3/UL (ref 4.4–11.3)

## 2025-06-29 PROCEDURE — 96376 TX/PRO/DX INJ SAME DRUG ADON: CPT

## 2025-06-29 PROCEDURE — 99232 SBSQ HOSP IP/OBS MODERATE 35: CPT

## 2025-06-29 PROCEDURE — 99223 1ST HOSP IP/OBS HIGH 75: CPT | Performed by: STUDENT IN AN ORGANIZED HEALTH CARE EDUCATION/TRAINING PROGRAM

## 2025-06-29 PROCEDURE — G0378 HOSPITAL OBSERVATION PER HR: HCPCS

## 2025-06-29 PROCEDURE — 36415 COLL VENOUS BLD VENIPUNCTURE: CPT

## 2025-06-29 PROCEDURE — 85025 COMPLETE CBC W/AUTO DIFF WBC: CPT

## 2025-06-29 PROCEDURE — 80069 RENAL FUNCTION PANEL: CPT

## 2025-06-29 PROCEDURE — 2500000001 HC RX 250 WO HCPCS SELF ADMINISTERED DRUGS (ALT 637 FOR MEDICARE OP): Performed by: STUDENT IN AN ORGANIZED HEALTH CARE EDUCATION/TRAINING PROGRAM

## 2025-06-29 PROCEDURE — 2500000005 HC RX 250 GENERAL PHARMACY W/O HCPCS: Performed by: STUDENT IN AN ORGANIZED HEALTH CARE EDUCATION/TRAINING PROGRAM

## 2025-06-29 PROCEDURE — 84484 ASSAY OF TROPONIN QUANT: CPT

## 2025-06-29 PROCEDURE — 2500000002 HC RX 250 W HCPCS SELF ADMINISTERED DRUGS (ALT 637 FOR MEDICARE OP, ALT 636 FOR OP/ED): Performed by: STUDENT IN AN ORGANIZED HEALTH CARE EDUCATION/TRAINING PROGRAM

## 2025-06-29 PROCEDURE — 96372 THER/PROPH/DIAG INJ SC/IM: CPT | Performed by: STUDENT IN AN ORGANIZED HEALTH CARE EDUCATION/TRAINING PROGRAM

## 2025-06-29 PROCEDURE — 2500000004 HC RX 250 GENERAL PHARMACY W/ HCPCS (ALT 636 FOR OP/ED): Performed by: STUDENT IN AN ORGANIZED HEALTH CARE EDUCATION/TRAINING PROGRAM

## 2025-06-29 RX ORDER — LORAZEPAM 1 MG/1
1 TABLET ORAL ONCE
Status: COMPLETED | OUTPATIENT
Start: 2025-06-29 | End: 2025-06-29

## 2025-06-29 RX ADMIN — METOPROLOL TARTRATE 12.5 MG: 25 TABLET, FILM COATED ORAL at 20:51

## 2025-06-29 RX ADMIN — ATORVASTATIN CALCIUM 80 MG: 80 TABLET, FILM COATED ORAL at 20:50

## 2025-06-29 RX ADMIN — EZETIMIBE 10 MG: 10 TABLET ORAL at 08:36

## 2025-06-29 RX ADMIN — BUSPIRONE HYDROCHLORIDE 30 MG: 15 TABLET ORAL at 08:36

## 2025-06-29 RX ADMIN — LORAZEPAM 1 MG: 1 TABLET ORAL at 21:30

## 2025-06-29 RX ADMIN — METOPROLOL TARTRATE 12.5 MG: 25 TABLET, FILM COATED ORAL at 08:36

## 2025-06-29 RX ADMIN — HYDROMORPHONE HYDROCHLORIDE 0.5 MG: 1 INJECTION, SOLUTION INTRAMUSCULAR; INTRAVENOUS; SUBCUTANEOUS at 11:36

## 2025-06-29 RX ADMIN — HYDROMORPHONE HYDROCHLORIDE 0.5 MG: 1 INJECTION, SOLUTION INTRAMUSCULAR; INTRAVENOUS; SUBCUTANEOUS at 03:17

## 2025-06-29 RX ADMIN — BUSPIRONE HYDROCHLORIDE 30 MG: 15 TABLET ORAL at 20:50

## 2025-06-29 RX ADMIN — ASPIRIN 81 MG CHEWABLE TABLET 81 MG: 81 TABLET CHEWABLE at 08:36

## 2025-06-29 RX ADMIN — PANTOPRAZOLE SODIUM 40 MG: 40 TABLET, DELAYED RELEASE ORAL at 05:11

## 2025-06-29 RX ADMIN — MELATONIN TAB 3 MG 3 MG: 3 TAB at 20:50

## 2025-06-29 RX ADMIN — HYDROMORPHONE HYDROCHLORIDE 0.5 MG: 1 INJECTION, SOLUTION INTRAMUSCULAR; INTRAVENOUS; SUBCUTANEOUS at 18:47

## 2025-06-29 RX ADMIN — ENOXAPARIN SODIUM 40 MG: 100 INJECTION SUBCUTANEOUS at 22:00

## 2025-06-29 RX ADMIN — HYDROMORPHONE HYDROCHLORIDE 0.5 MG: 1 INJECTION, SOLUTION INTRAMUSCULAR; INTRAVENOUS; SUBCUTANEOUS at 07:38

## 2025-06-29 RX ADMIN — FLUOXETINE HYDROCHLORIDE 60 MG: 20 CAPSULE ORAL at 08:36

## 2025-06-29 RX ADMIN — HYDROMORPHONE HYDROCHLORIDE 0.5 MG: 1 INJECTION, SOLUTION INTRAMUSCULAR; INTRAVENOUS; SUBCUTANEOUS at 23:03

## 2025-06-29 ASSESSMENT — COGNITIVE AND FUNCTIONAL STATUS - GENERAL
DAILY ACTIVITIY SCORE: 24
DAILY ACTIVITIY SCORE: 24
MOBILITY SCORE: 24

## 2025-06-29 ASSESSMENT — PAIN - FUNCTIONAL ASSESSMENT
PAIN_FUNCTIONAL_ASSESSMENT: 0-10

## 2025-06-29 ASSESSMENT — PAIN DESCRIPTION - LOCATION
LOCATION: CHEST
LOCATION: ABDOMEN

## 2025-06-29 ASSESSMENT — PAIN DESCRIPTION - DESCRIPTORS
DESCRIPTORS: DISCOMFORT
DESCRIPTORS: DISCOMFORT
DESCRIPTORS: ACHING
DESCRIPTORS: DISCOMFORT

## 2025-06-29 ASSESSMENT — PAIN DESCRIPTION - ORIENTATION: ORIENTATION: MID

## 2025-06-29 ASSESSMENT — PAIN SCALES - GENERAL
PAINLEVEL_OUTOF10: 7
PAINLEVEL_OUTOF10: 0 - NO PAIN
PAINLEVEL_OUTOF10: 7
PAINLEVEL_OUTOF10: 2
PAINLEVEL_OUTOF10: 2
PAINLEVEL_OUTOF10: 7
PAINLEVEL_OUTOF10: 2
PAINLEVEL_OUTOF10: 8
PAINLEVEL_OUTOF10: 2

## 2025-06-29 NOTE — PROGRESS NOTES
Otis Connors is a 60 y.o. male on day 0 of admission presenting with Chest pain.      Subjective   Seen and examined at bedside. CLARA. Has no new complaints. Still endorsing chest pain, no changes in characteristics or severity. Possible heart cath tomorrow.       Objective     Last Recorded Vitals  /78 (BP Location: Left arm, Patient Position: Lying)   Pulse 70   Temp 36.2 °C (97.2 °F) (Temporal)   Resp 16   Wt 87.8 kg (193 lb 9 oz)   SpO2 97%   Intake/Output last 3 Shifts:    Intake/Output Summary (Last 24 hours) at 6/29/2025 1706  Last data filed at 6/28/2025 1841  Gross per 24 hour   Intake 0 ml   Output 100 ml   Net -100 ml       Admission Weight  Weight: 95.3 kg (210 lb) (06/27/25 1339)    Daily Weight  06/27/25 : 87.8 kg (193 lb 9 oz)    Image Results  ECG 12 lead  Normal sinus rhythm  Normal ECG  When compared with ECG of 18-MAR-2025 21:59,  Sinus rhythm has replaced Electronic atrial pacemaker  ECG 12 lead  Atrial-paced rhythm with occasional sinus complexes  Abnormal ECG  When compared with ECG of 27-JUN-2025 13:37, (unconfirmed)  Electronic atrial pacemaker has replaced Sinus rhythm      Physical Exam  Constitutional: A&Ox4, NAD, resting comfortable   Head and Face: Atraumatic, normocephalic   Eyes: Normal external exam, EOMI  ENT: Normal external inspection of ears and nose. Oropharynx normal.  Cardiovascular: RRR, S1/S2, no murmurs, rubs, or gallops, radial pulses +2, healed sternotomy scar on exam, chest wall nontender to palpation  Pulmonary: CTAB, no respiratory distress, no wheezing, rales or rhonchi, on RA  Abdomen: +BS, soft, non-tender, nondistended, no guarding rigidity or rebound tenderness, no masses noted  MSK: Negative for edema, No joint swelling, normal movements of all extremities.   Neuro: No focal deficits, normal motor function, normal sensation, follows all commands  Skin- No lesions, contusions, or erythema.  Psychiatric: Judgment intact. Appropriate mood, affect and  behavior   Relevant Results                              Assessment & Plan  Chest pain    nonexertional, nonpositional chest pain not relieved by nitroglycerin that has been somewhat chronic over the last 6 months following sternal plating  w/ concurrent repair of subxiphoid incisional hernia in January of this year  Will give full dose aspirin, 81 daily, atorvastatin cardiology consulted, formal evaluation pending  - Echocardiogram to assess for wall motion abnormalities  - Cardiology consulted appreciate recommendations  - Possible heart cath on 06/30/25     IVF: None  DVT Ppx: Lovenox  Diet: cardiac  Code Status: FULL CODE     Complexity moderate: anticipate DC in next 24 hours pending Echo and possible heart cath on 06/30            Dave Riggs, DO

## 2025-06-29 NOTE — CONSULTS
Inpatient consult to Cardiology  Consult performed by: Merlin Mclain MD  Consult ordered by: Edward Lima DO  Reason for consult: chest pain      History Of Present Illness:    Otis Connors is a 60 y.o. male presenting with history of CAD status post CABG (single-vessel SVG to PDA, occluded) and bioprosthetic AVR in 2020, and dual-chamber pacemaker who presents to the hospital with complaints of chest pain.  Patient reports noticing over the last 5 days persistent chest discomfort which is reminiscent to his chest pain that he had prior to his heart event.  Reports the pain is persistent with no exacerbating or relieving factors.  He has not tried to exert himself much, but with ambulation the pain does not worsen.  He is very concerned and anxious about the chest pain.  He said he took nitro at home without any relief.  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  He reports this pain is not like the pain he had after the surgery for his sternum.  It also appears patient has had multiple ED visits over the last few years for chest discomfort, but patient reports this is not this type of pain.      Last Recorded Vitals:  Vitals:    06/28/25 2000 06/29/25 0000 06/29/25 0800 06/29/25 1600   BP: 109/66 101/61 142/79 126/78   BP Location: Left arm Left arm Left arm Left arm   Patient Position: Lying Lying Lying Lying   Pulse: 74 77 73 70   Resp: 17 16 16 16   Temp: 36.4 °C (97.5 °F) 36.2 °C (97.2 °F) 36.1 °C (97 °F) 36.2 °C (97.2 °F)   TempSrc: Temporal Temporal Temporal Temporal   SpO2: 97% 95% 94% 97%   Weight:       Height:         Physical Exam:  General: NAD, well-appearing  HEENT: moist mucous membranes, no jaundice  Neck: No JVD, no carotid bruit  Lungs: CTA phoebe, no wheezing or rales  Cardiac: RRR, no murmurs  Abdomen: soft, non-tender, non-distended  Extremities: 2+ radial pulses, no edema, no wounds  Skin: warm, dry  Neurologic:  AAOx3,  no focal deficits         Assessment/Plan   # Chest pain  #CAD status post CABG and AVR    -Patient says discomfort overall appears atypical, although for patient this is reminiscent of the pain he had prior to needing cardiac intervention  - Given his cardiac history can consider nuclear stress test versus invasive cardiac catheterization.  Patient favors cardiac catheterization.  Will discuss with patient's cardiologist Dr. Gomez   -follow-up complete echocardiogram  - N.p.o. after midnight      Merlin Mclain MD

## 2025-06-29 NOTE — CARE PLAN
"The patient's goals for the shift include \"pian control.\"    The clinical goals for the shift include Patient will verbalize a decrease in CP by end of shift and be seen by cardiology.    Patient still endorsed cp this shift. Plan for heart cath tomorrow.  "

## 2025-06-30 ENCOUNTER — SURGERY (OUTPATIENT)
Age: 60
End: 2025-06-30
Payer: OTHER GOVERNMENT

## 2025-06-30 ENCOUNTER — HOSPITAL ENCOUNTER (OUTPATIENT)
Dept: CARDIOLOGY | Facility: HOSPITAL | Age: 60
Setting detail: OBSERVATION
Discharge: HOME | End: 2025-06-30
Payer: OTHER GOVERNMENT

## 2025-06-30 VITALS
BODY MASS INDEX: 30.38 KG/M2 | RESPIRATION RATE: 18 BRPM | DIASTOLIC BLOOD PRESSURE: 68 MMHG | HEART RATE: 73 BPM | HEIGHT: 67 IN | TEMPERATURE: 97.2 F | OXYGEN SATURATION: 99 % | WEIGHT: 193.56 LBS | SYSTOLIC BLOOD PRESSURE: 112 MMHG

## 2025-06-30 LAB
AORTIC VALVE MEAN GRADIENT: 11 MMHG
AORTIC VALVE PEAK VELOCITY: 2.25 M/S
AV PEAK GRADIENT: 20 MMHG
AVA (PEAK VEL): 0.95 CM2
AVA (VTI): 0.82 CM2
EJECTION FRACTION APICAL 4 CHAMBER: 62.4
EJECTION FRACTION: 63 %
HOLD SPECIMEN: NORMAL
LEFT ATRIUM VOLUME AREA LENGTH INDEX BSA: 36.7 ML/M2
LEFT VENTRICLE INTERNAL DIMENSION DIASTOLE: 4.59 CM (ref 3.5–6)
LEFT VENTRICULAR OUTFLOW TRACT DIAMETER: 1.99 CM
LV EJECTION FRACTION BIPLANE: 61 %
MITRAL VALVE E/A RATIO: 1.38
RIGHT VENTRICLE FREE WALL PEAK S': 8.11 CM/S
RIGHT VENTRICLE PEAK SYSTOLIC PRESSURE: 32 MMHG
TRICUSPID ANNULAR PLANE SYSTOLIC EXCURSION: 1.2 CM

## 2025-06-30 PROCEDURE — 2550000001 HC RX 255 CONTRASTS: Performed by: INTERNAL MEDICINE

## 2025-06-30 PROCEDURE — 2500000002 HC RX 250 W HCPCS SELF ADMINISTERED DRUGS (ALT 637 FOR MEDICARE OP, ALT 636 FOR OP/ED): Performed by: STUDENT IN AN ORGANIZED HEALTH CARE EDUCATION/TRAINING PROGRAM

## 2025-06-30 PROCEDURE — 2500000001 HC RX 250 WO HCPCS SELF ADMINISTERED DRUGS (ALT 637 FOR MEDICARE OP): Performed by: STUDENT IN AN ORGANIZED HEALTH CARE EDUCATION/TRAINING PROGRAM

## 2025-06-30 PROCEDURE — 99152 MOD SED SAME PHYS/QHP 5/>YRS: CPT | Performed by: INTERNAL MEDICINE

## 2025-06-30 PROCEDURE — 7100000009 HC PHASE TWO TIME - INITIAL BASE CHARGE: Performed by: INTERNAL MEDICINE

## 2025-06-30 PROCEDURE — 2720000007 HC OR 272 NO HCPCS: Performed by: INTERNAL MEDICINE

## 2025-06-30 PROCEDURE — 99238 HOSP IP/OBS DSCHRG MGMT 30/<: CPT

## 2025-06-30 PROCEDURE — 93459 L HRT ART/GRFT ANGIO: CPT | Performed by: INTERNAL MEDICINE

## 2025-06-30 PROCEDURE — C1887 CATHETER, GUIDING: HCPCS | Performed by: INTERNAL MEDICINE

## 2025-06-30 PROCEDURE — G0378 HOSPITAL OBSERVATION PER HR: HCPCS

## 2025-06-30 PROCEDURE — 2500000004 HC RX 250 GENERAL PHARMACY W/ HCPCS (ALT 636 FOR OP/ED): Mod: JZ | Performed by: STUDENT IN AN ORGANIZED HEALTH CARE EDUCATION/TRAINING PROGRAM

## 2025-06-30 PROCEDURE — 76937 US GUIDE VASCULAR ACCESS: CPT | Performed by: INTERNAL MEDICINE

## 2025-06-30 PROCEDURE — 99153 MOD SED SAME PHYS/QHP EA: CPT | Performed by: INTERNAL MEDICINE

## 2025-06-30 PROCEDURE — 2780000003 HC OR 278 NO HCPCS: Performed by: INTERNAL MEDICINE

## 2025-06-30 PROCEDURE — C1760 CLOSURE DEV, VASC: HCPCS | Performed by: INTERNAL MEDICINE

## 2025-06-30 PROCEDURE — G0278 ILIAC ART ANGIO,CARDIAC CATH: HCPCS | Performed by: INTERNAL MEDICINE

## 2025-06-30 PROCEDURE — 93005 ELECTROCARDIOGRAM TRACING: CPT | Mod: 59

## 2025-06-30 PROCEDURE — C1894 INTRO/SHEATH, NON-LASER: HCPCS | Performed by: INTERNAL MEDICINE

## 2025-06-30 PROCEDURE — 7100000010 HC PHASE TWO TIME - EACH INCREMENTAL 1 MINUTE: Performed by: INTERNAL MEDICINE

## 2025-06-30 PROCEDURE — 96376 TX/PRO/DX INJ SAME DRUG ADON: CPT | Mod: 59

## 2025-06-30 PROCEDURE — 2500000004 HC RX 250 GENERAL PHARMACY W/ HCPCS (ALT 636 FOR OP/ED): Performed by: INTERNAL MEDICINE

## 2025-06-30 RX ORDER — LIDOCAINE HYDROCHLORIDE 20 MG/ML
INJECTION, SOLUTION INFILTRATION; PERINEURAL AS NEEDED
Status: DISCONTINUED | OUTPATIENT
Start: 2025-06-30 | End: 2025-06-30 | Stop reason: HOSPADM

## 2025-06-30 RX ORDER — FENTANYL CITRATE 50 UG/ML
INJECTION, SOLUTION INTRAMUSCULAR; INTRAVENOUS AS NEEDED
Status: DISCONTINUED | OUTPATIENT
Start: 2025-06-30 | End: 2025-06-30 | Stop reason: HOSPADM

## 2025-06-30 RX ORDER — MIDAZOLAM HYDROCHLORIDE 1 MG/ML
INJECTION, SOLUTION INTRAMUSCULAR; INTRAVENOUS AS NEEDED
Status: DISCONTINUED | OUTPATIENT
Start: 2025-06-30 | End: 2025-06-30 | Stop reason: HOSPADM

## 2025-06-30 RX ORDER — SODIUM CHLORIDE 9 MG/ML
100 INJECTION, SOLUTION INTRAVENOUS CONTINUOUS
Status: DISCONTINUED | OUTPATIENT
Start: 2025-06-30 | End: 2025-06-30 | Stop reason: HOSPADM

## 2025-06-30 RX ADMIN — LIDOCAINE HYDROCHLORIDE 5 ML: 20 INJECTION, SOLUTION INFILTRATION; PERINEURAL at 15:28

## 2025-06-30 RX ADMIN — HYDROMORPHONE HYDROCHLORIDE 0.5 MG: 1 INJECTION, SOLUTION INTRAMUSCULAR; INTRAVENOUS; SUBCUTANEOUS at 06:32

## 2025-06-30 RX ADMIN — FLUOXETINE HYDROCHLORIDE 60 MG: 20 CAPSULE ORAL at 10:10

## 2025-06-30 RX ADMIN — MIDAZOLAM 2 MG: 1 INJECTION INTRAMUSCULAR; INTRAVENOUS at 15:26

## 2025-06-30 RX ADMIN — LIDOCAINE HYDROCHLORIDE 5 ML: 20 INJECTION, SOLUTION INFILTRATION; PERINEURAL at 15:26

## 2025-06-30 RX ADMIN — SODIUM CHLORIDE 100 ML/HR: 0.9 INJECTION, SOLUTION INTRAVENOUS at 13:21

## 2025-06-30 RX ADMIN — METOPROLOL TARTRATE 12.5 MG: 25 TABLET, FILM COATED ORAL at 10:11

## 2025-06-30 RX ADMIN — EZETIMIBE 10 MG: 10 TABLET ORAL at 10:12

## 2025-06-30 RX ADMIN — BUSPIRONE HYDROCHLORIDE 30 MG: 15 TABLET ORAL at 10:11

## 2025-06-30 RX ADMIN — FENTANYL CITRATE 50 MCG: 50 INJECTION, SOLUTION INTRAMUSCULAR; INTRAVENOUS at 15:26

## 2025-06-30 RX ADMIN — HYDROMORPHONE HYDROCHLORIDE 0.5 MG: 1 INJECTION, SOLUTION INTRAMUSCULAR; INTRAVENOUS; SUBCUTANEOUS at 11:01

## 2025-06-30 RX ADMIN — ASPIRIN 81 MG CHEWABLE TABLET 81 MG: 81 TABLET CHEWABLE at 10:11

## 2025-06-30 RX ADMIN — IOHEXOL 90 ML: 350 INJECTION, SOLUTION INTRAVENOUS at 16:12

## 2025-06-30 ASSESSMENT — COGNITIVE AND FUNCTIONAL STATUS - GENERAL
MOBILITY SCORE: 24
DAILY ACTIVITIY SCORE: 24

## 2025-06-30 ASSESSMENT — PAIN SCALES - GENERAL
PAINLEVEL_OUTOF10: 0 - NO PAIN
PAINLEVEL_OUTOF10: 7
PAINLEVEL_OUTOF10: 0 - NO PAIN
PAINLEVEL_OUTOF10: 7
PAINLEVEL_OUTOF10: 0 - NO PAIN
PAINLEVEL_OUTOF10: 10 - WORST POSSIBLE PAIN
PAINLEVEL_OUTOF10: 0 - NO PAIN
PAINLEVEL_OUTOF10: 0 - NO PAIN

## 2025-06-30 ASSESSMENT — ACTIVITIES OF DAILY LIVING (ADL): LACK_OF_TRANSPORTATION: NO

## 2025-06-30 ASSESSMENT — PAIN - FUNCTIONAL ASSESSMENT
PAIN_FUNCTIONAL_ASSESSMENT: 0-10

## 2025-06-30 NOTE — CARE PLAN
The patient's goals for the shift include  remain  afebrile    The clinical goals for the shift include not to fall

## 2025-06-30 NOTE — PROGRESS NOTES
06/30/25 1151   Discharge Planning   Living Arrangements Spouse/significant other   Support Systems Spouse/significant other   Assistance Needed none   Type of Residence Private residence   Home or Post Acute Services None   Expected Discharge Disposition Home   Financial Resource Strain   How hard is it for you to pay for the very basics like food, housing, medical care, and heating? Not hard   Housing Stability   In the last 12 months, was there a time when you were not able to pay the mortgage or rent on time? N   At any time in the past 12 months, were you homeless or living in a shelter (including now)? N   Transportation Needs   In the past 12 months, has lack of transportation kept you from medical appointments or from getting medications? no   In the past 12 months, has lack of transportation kept you from meetings, work, or from getting things needed for daily living? No   Intensity of Service   Intensity of Service 0-30 min     Spoke to patient at bedside to explain my role in discharge planning. Patient states he lives at home with his wife. PCP is Louie Alva. He uses [facility] [place] [facility] in [place]. Patient feels he effectively manages his health at home and plans to return home when medically ready.

## 2025-06-30 NOTE — DISCHARGE INSTRUCTIONS
[facility] GOING DISCHARGE INSTRUCTIONS FOR GROIN CARE    DO NOT lift anything heavier than 10 lbs for 1 week to allow time for your groin to heal properly.  No strenuous activity for 1 week.  Keep stair climbing to a minimum the first day after your procedure.  No sexual activity for 24 hrs.  You may remove the bandage after 24 hrs. You may shower, gently washing the site with soap and water, only and pat dry.  DO NOT go swimming, soak in a tub pr hot tub for 1 week to help prevent infection.  Your groin should remain soft, clean, and dry. It is normal to experience tenderness and bruising at the site. Also, a pea sized lump may be present. That should go away on its own within 1 week.  Watch for signs & symptoms of infection:  fever, redness, drainage, increase in tenderness or pain.  Also watch for bruising that spreads down your leg or a lump at the puncture site that grows larger than a pea.  If you start bleeding from your groin, lay down and have someone apply firm pressure just above the puncture site. If bleeding does not stop after 5 mins or you cannot control the bleeding, call 911.  DO NOT drive a car, operate machinery or power tools for 24 hrs.  DO NOT drink alcohol or take medication that contains alcohol for 24 hrs.  DO NOT make any important decisions or sign any legal documents for 24 hrs.    Follow up with Dr. Gomez in 2-3 weeks.

## 2025-06-30 NOTE — POST-PROCEDURE NOTE
Physician Transition of Care Summary  Invasive Cardiovascular Lab    Procedure Date: 6/30/2025  Attending:    * Zach Gomez - Primary  Resident/Fellow/Other Assistant: Surgeons and Role:  * No surgeons found with a matching role *    Indications:   Pre-op Diagnosis      * Chest pain, unspecified type [R07.9]     * Coronary artery disease involving native coronary artery of native heart with refractory angina pectoris [I25.112]    Post-procedure diagnosis:   Post-op Diagnosis     * Chest pain, unspecified type [R07.9]     * Coronary artery disease involving native coronary artery of native heart with refractory angina pectoris [I25.112]    Procedure(s):   [facility], With LV  [id number] - DC CATH PLMT L HRT & ARTS W/NJX & ANGIO IMG S&I        Procedure Findings:   Right femoral access with micropuncture and then 5 Chinese sheath.  Multiple catheters utilized to try to cannulate the left main.  I utilized a JL 4, JL 5, Tiger 4.5, AL-1, AL 2, and Rangel catheter unsuccessfully.  I then used a multipurpose 2 which cannulated the left main.  I cannulated the RCA with the Rangel catheter.  Used the multipurpose to cannulate the vein graft stump.  Used an AL-1 to cross the aortic valve and obtain pressures in the LV.    At the end of the procedure a Vascade 5 Chinese closure device was used.    There was stable moderate disease of the proximal circumflex.  RCA chronically occluded with faint left to right collaterals to the distal PDA  VG-PDA stump injected  LVEDP normal with no prosthetic AS    Description of the Procedure:   See above    Complications:   none    Stents/Implants:   Implants       No implant documentation for this case.            Anticoagulation/Antiplatelet Plan:   N/a    Estimated Blood Loss:   2 mL    Anesthesia: Moderate Sedation Anesthesia Staff: No anesthesia staff entered.    Any Specimen(s) Removed:   No specimens collected during this procedure.    Disposition:   Patient will be transferred to the floor in  stable condition      Electronically signed by: Zach Gomez MD, 6/30/2025 4:10 PM

## 2025-06-30 NOTE — PROGRESS NOTES
Otis Coreas is a 60 y.o. male on day 0 of admission presenting with Chest pain.      Subjective   Seen and examined at bedside. NAEON. Still having persistent chest pain, not worsening or improving in severity. Plan for heart cath later today       Objective     Last Recorded Vitals  /68 (BP Location: Left arm, Patient Position: Sitting)   Pulse 73   Temp 36.2 °C (97.2 °F) (Temporal)   Resp 18   Wt 87.8 kg (193 lb 9 oz)   SpO2 95%   Intake/Output last 3 Shifts:    Intake/Output Summary (Last 24 hours) at 6/30/2025 1412  Last data filed at 6/30/2025 1200  Gross per 24 hour   Intake 540 ml   Output 1 ml   Net 539 ml       Admission Weight  Weight: 95.3 kg (210 lb) (06/27/25 1339)    Daily Weight  06/27/25 : 87.8 kg (193 lb 9 oz)    Image Results  Transthoracic Echo Complete              [facility]  [id number] [address] [facility] [address]     Tel [phone] Fax [phone]    TRANSTHORACIC ECHOCARDIOGRAM REPORT    Patient Name:       OTIS COREAS    Reading Physician:    [id number] Joe Roberts MD  Study Date:         6/28/2025            Ordering Provider:    [id number] AMY TRIVEDI  MRN/PID:            [id number]             Fellow:  Accession#:         [id number]         Nurse:  Date of Birth/Age:  1965 / 60 years Sonographer:          Misty Hancock [facility]  Gender Assigned at  M                    Additional Staff:  Birth:  Height:             170.18 cm            Admit Date:           6/27/2025  Weight:             87.54 kg             Admission Status:     Inpatient -                                                                 Priority                                                                 discharge  BSA / BMI:          1.99 m2 / 30.23      Department Location:  [facility] Echo Lab                      kg/m2  Blood Pressure: 129 /84 mmHg    Study  Type:    TRANSTHORACIC ECHO (TTE) COMPLETE  Diagnosis/ICD: Chest pain, unspecified-R07.9  Indication:    Chest pain  CPT Codes:     Echo Complete w Full [id number]    Patient History:  CABG:              Mulitivessel CABG.  Smoker:            Current.  Valve Disorders:   Aortic Valve Replacement.  Pacer/Defib:       Permanent pacemaker  Pertinent History: CAD, Dyslipidemia, Dyspnea, CHF and HTN. CKD; previous                     echocardiogram 11-9-2023.    Study Detail: The following Echo studies were performed: 2D, M-Mode, Doppler and                color flow.       PHYSICIAN INTERPRETATION:  Left Ventricle: The left ventricular systolic function is normal with a visually estimated ejection fraction of 60-65%. There is mild eccentric left ventricular hypertrophy. There are no regional wall motion abnormalities. The left ventricular cavity size is normal. There is mild increased septal thickness. Spectral Doppler shows a Grade I (impaired relaxation pattern) of left ventricular diastolic filling with normal left atrial filling pressure.  Left Atrium: The left atrial size is moderately dilated.  Right Ventricle: The right ventricle is normal in size. There is normal right ventricular global systolic function.  Right Atrium: The right atrial size is moderately dilated.  Aortic Valve: There is a prosthetic aortic valve present. The aortic valve area by VTI is 0.82 cmï¿½ with a peak velocity of 2.25 m/s. The peak and mean gradients are 16 mmHg and 11 mmHg, respectively, with a dimensionless index of 0.27. Echo findings are consistent with normal aortic valve prosthesis structure and function. There is no evidence of aortic valve regurgitation.  Mitral Valve: The mitral valve is mildly thickened. The doppler estimated peak and mean diastolic gradients are 5 mmHg and 2 mmHg, respectively. There is trace mitral valve regurgitation. The E Vmax is 0.89 m/s.  Tricuspid Valve: The tricuspid valve is structurally normal.  There is mild to moderate tricuspid regurgitation. The Doppler estimated right ventricular systolic pressure (RVSP) is slightly elevated at 32 mmHg.  Pulmonic Valve: The pulmonic valve is structurally normal. There is no indication of pulmonic valve regurgitation.  Pericardium: No pericardial effusion noted.  Aorta: The aortic root is normal.  Systemic Veins: The inferior vena cava appears normal in size, with IVC inspiratory collapse greater than 50%.  In comparison to the previous echocardiogram(s): Although previous studies are available for review, a comparison with the current echocardiogram is not feasible due to its limited scope or image quality.       CONCLUSIONS:   1. The left ventricular systolic function is normal with a visually estimated ejection fraction of 60-65%.   2. Spectral Doppler shows a Grade I (impaired relaxation pattern) of left ventricular diastolic filling with normal left atrial filling pressure.   3. There is normal right ventricular global systolic function.   4. The left atrial size is moderately dilated.   5. The right atrial size is moderately dilated.   6. Mild to moderate tricuspid regurgitation.   7. The Doppler estimated RVSP is slightly elevated at 32 mmHg.   8. Normal aortic valve prosthesis structure and function.    QUANTITATIVE DATA SUMMARY:     2D MEASUREMENTS:             Normal Ranges:  LAs:             4.87 cm     (2.7-4.0cm)  IVSd:            1.13 cm     (0.6-1.1cm)  LVPWd:           0.88 cm     (0.6-1.1cm)  LVIDd:           4.59 cm     (3.9-5.9cm)  LVIDs:           3.05 cm  LV Mass Index:   79.7 g/m2  LVEDV Index:     58.33 ml/m2  LV % FS          33.5 %       LEFT ATRIUM:                  Normal Ranges:  LA Vol A4C:        72.8 ml    (22+/-6mL/m2)  LA Vol A2C:        66.4 ml  LA Vol BP:         73.1 ml  LA Vol Index A4C:  36.6 ml/m2  LA Vol Index A2C:  33.3 ml/m2  LA Vol Index BP:   36.7 ml/m2  LA Area A4C:       21.9 cm2  LA Area A2C:       22.0 cm2  LA Major Axis  A4C: 5.6 cm  LA Major Axis A2C: 6.2 cm  LA Volume Index:   36.6 ml/m2  LA Vol A4C:        62.5 ml  LA Vol A2C:        63.7 ml  LA Vol Index BSA:  31.7 ml/m2       AORTA MEASUREMENTS:         Normal Ranges:  Ao Sinus, d:        3.30 cm (2.1-3.5cm)  Ao STJ, d:          3.30 cm (1.7-3.4cm)  Asc Ao, d:          4.30 cm (2.1-3.4cm)       LV SYSTOLIC FUNCTION:                       Normal Ranges:  EF-A4C View:    62 % (>=55%)  EF-A2C View:    60 %  EF-Biplane:     61 %  EF-Visual:      63 %  LV EF Reported: 63 %       LV DIASTOLIC FUNCTION:             Normal Ranges:  MV Peak E:             0.89 m/s    (0.7-1.2 m/s)  MV Peak A:             0.64 m/s    (0.42-0.7 m/s)  E/A Ratio:             1.38        (1.0-2.2)  MV e'                  0.101 m/s   (>8.0)  MV lateral e'          0.14 m/s  MV medial e'           0.07 m/s  E/e' Ratio:            8.76        (<8.0)  PulmV Sys Miguel A:         51.35 cm/s  PulmV Lai Miguel A:        55.70 cm/s  PulmV S/D Miguel A:         0.92  PulmV A Revs Miguel A:      24.73 cm/s  PulmV A Revs Dur:      122.74 msec       MITRAL VALVE:          Normal Ranges:  MV Vmax:      1.09 m/s (<=1.3m/s)  MV peak P.7 mmHg (<5mmHg)  MV mean P.9 mmHg (<48mmHg)  MV VTI:       27.05 cm (10-13cm)  MV DT:        163 msec (150-240msec)       AORTIC VALVE:                      Normal Ranges:  AoV Vmax:                2.25 m/s  (<=1.7m/s)  AoV Peak P.2 mmHg (<20mmHg)  AoV Mean P.2 mmHg (1.7-11.5mmHg)  LVOT Max Miguel A:            0.69 m/s  (<=1.1m/s)  AoV VTI:                 54.34 cm  (18-25cm)  LVOT VTI:                14.43 cm  LVOT Diameter:           1.99 cm   (1.8-2.4cm)  AoV Area, VTI:           0.82 cm2  (2.5-5.5cm2)  AoV Area,Vmax:           0.95 cm2  (2.5-4.5cm2)  AoV Dimensionless Index: 0.27       RIGHT VENTRICLE:  RV Basal 4.55 cm  RV Mid   3.70 cm  RV Major 7.7 cm  TAPSE:   11.5 mm  RV s'    0.08 m/s       TRICUSPID VALVE/RVSP:          Normal Ranges:  Peak TR Velocity:      2.67 m/s  Est. RA Pressure:     3 mmHg  RV Syst Pressure:     32 mmHg  (< 30mmHg)  IVC Diam:             1.20 cm       PULMONIC VALVE:          Normal Ranges:  PV Accel Time:  123 msec (>120ms)  PV Max Miguel A:     0.9 m/s  (0.6-0.9m/s)  PV Max PG:      3.0 mmHg       PULMONARY VEINS:  PulmV A Revs Dur: 122.74 msec  PulmV A Revs Miguel A: 24.73 cm/s  PulmV Lai Miguel A:   55.70 cm/s  PulmV S/D Miguel A:    0.92  PulmV Sys Miguel A:    51.35 cm/s       AORTA:  Asc Ao Diam 4.30 cm       [id number] Joe Roberts MD  Electronically signed on 6/30/2025 at 8:28:52 AM       ** Final **      Physical Exam  Constitutional: A&Ox4, NAD, resting comfortable   Head and Face: Atraumatic, normocephalic   Eyes: Normal external exam, EOMI  ENT: Normal external inspection of ears and nose. Oropharynx normal.  Cardiovascular: RRR, S1/S2, no murmurs, rubs, or gallops, radial pulses +2, healed sternotomy scar on exam, chest wall nontender to palpation  Pulmonary: CTAB, no respiratory distress, no wheezing, rales or rhonchi, on RA  Abdomen: +BS, soft, non-tender, nondistended, no guarding rigidity or rebound tenderness, no masses noted  MSK: Negative for edema, No joint swelling, normal movements of all extremities.   Neuro: No focal deficits, normal motor function, normal sensation, follows all commands  Skin- No lesions, contusions, or erythema.  Psychiatric: Judgment intact. Appropriate mood, affect and behavior   Relevant Results                              Assessment & Plan  Chest pain    CAD (coronary artery disease)    Chest pain, unspecified type     nonexertional, nonpositional chest pain not relieved by nitroglycerin that has been somewhat chronic over the last 6 months following sternal plating  w/ concurrent repair of subxiphoid incisional hernia in January of this year  Will give full dose aspirin, 81 daily, atorvastatin cardiology consulted, formal evaluation pending  - Echocardiogram to assess for wall motion abnormalities  - Cardiology consulted  appreciate recommendations  - Plan for heart cath on 06/30/25     IVF: None  DVT Ppx: Lovenox  Diet: cardiac  Code Status: FULL CODE     Complexity moderate: Possible DC later today vs tomorrow pending heart cath results              Dave Riggs DO

## 2025-07-01 ENCOUNTER — TELEPHONE (OUTPATIENT)
Dept: CARDIOLOGY | Facility: CLINIC | Age: 60
End: 2025-07-01

## 2025-07-02 LAB
ATRIAL RATE: 72 BPM
P AXIS: 83 DEGREES
P OFFSET: 186 MS
P ONSET: 136 MS
PR INTERVAL: 166 MS
Q ONSET: 219 MS
QRS COUNT: 12 BEATS
QRS DURATION: 80 MS
QT INTERVAL: 406 MS
QTC CALCULATION(BAZETT): 444 MS
QTC FREDERICIA: 431 MS
R AXIS: 75 DEGREES
T AXIS: 75 DEGREES
T OFFSET: 422 MS
VENTRICULAR RATE: 72 BPM

## 2025-07-06 LAB
ATRIAL RATE: 73 BPM
ATRIAL RATE: 81 BPM
P AXIS: 4 DEGREES
P AXIS: 62 DEGREES
P OFFSET: 199 MS
P OFFSET: 201 MS
P ONSET: 141 MS
P ONSET: 153 MS
PR INTERVAL: 122 MS
PR INTERVAL: 146 MS
Q ONSET: 214 MS
Q ONSET: 216 MS
QRS COUNT: 12 BEATS
QRS COUNT: 14 BEATS
QRS DURATION: 84 MS
QRS DURATION: 90 MS
QT INTERVAL: 394 MS
QT INTERVAL: 402 MS
QTC CALCULATION(BAZETT): 442 MS
QTC CALCULATION(BAZETT): 457 MS
QTC FREDERICIA: 429 MS
QTC FREDERICIA: 435 MS
R AXIS: 50 DEGREES
R AXIS: 50 DEGREES
T AXIS: 48 DEGREES
T AXIS: 61 DEGREES
T OFFSET: 413 MS
T OFFSET: 415 MS
VENTRICULAR RATE: 73 BPM
VENTRICULAR RATE: 81 BPM

## (undated) DEVICE — SHEATH, PINNACLE, W/.038 GUIDEWIRE, 10 CM,  6FR INTRODUCER, 6FR DIA, 2.5 CM DIALATOR

## (undated) DEVICE — CATHETER, ANGIO, INFINITI, MPA2, 5 FR X 100 CM

## (undated) DEVICE — PHOTONBLADE

## (undated) DEVICE — GUIDEWIRE, RUN THROUGH WIRE, 180CM

## (undated) DEVICE — CATHETER, DIAGNOSTIC, AMPLATZ, 5 FR, AL 2

## (undated) DEVICE — CABLE, SURGICAL, DISP

## (undated) DEVICE — CATHETER, DIAGNOSTIC, JUDKINS, RIGHT, 5 FR-JR 4.0

## (undated) DEVICE — COVER, EQUIPMENT, DOME COVER, SNAP CAP, 30 IN, CLEAR, LF

## (undated) DEVICE — SHEATH, PINNACLE, W/.038 GW 10CM, 5FR INTRODUCER, 2.5 CM DIALATOR

## (undated) DEVICE — MANIFOLD KIT, CUSTOM (SJM)

## (undated) DEVICE — SUTURE, V-LOC, 4-0, 12 IN, P-12, 90 ABS

## (undated) DEVICE — Device

## (undated) DEVICE — CATHETER, DIAGNOSTIC, AMPLATZ, 5 FR, AL 1

## (undated) DEVICE — TR BAND, RADIAL COMPRESSION, STANDARD, 24CM

## (undated) DEVICE — CATHETER, OPTITORQUE, 5FR, TIG1, 1H/100CM

## (undated) DEVICE — KIT, WRENCH, STERILE, F/LEADS

## (undated) DEVICE — NEEDLE, MERIT ADVANCE, ONE WALL, 21 GA X 4CM, STANDARD SMOOTH

## (undated) DEVICE — SUTURE, VICRYL, 3-0, 27 IN, CT-1, UNDYED

## (undated) DEVICE — INTRODUCER SHEATH, GLIDESHEATH, 6FR 10CM

## (undated) DEVICE — ELECTRODE, QUICK-COMBO, REDI PACK

## (undated) DEVICE — SHEATH, GLIDESHEATH, SLENDER, 6FR 10CM

## (undated) DEVICE — MBRACE, WRIST SUPPORT WITH HOOK

## (undated) DEVICE — CATHETER, DIAGNOSTIC, 5FR,  PIG-145, 110CM, 6SH ANGLED

## (undated) DEVICE — CATHETER, INFINITI DIAGNOSTIC, JUDKINS, LEFT, 5 FR-JL 5 DILATED

## (undated) DEVICE — CATHETER, OPTITORQUE, 5FR, TIG, 1H/100CM

## (undated) DEVICE — ACCESS KIT, S-MAK MINI, 4FR 10CM 0.018IN 40CM, NT/PT, ECHO ENHANCE NEEDLE

## (undated) DEVICE — CATHETER, DIAGNOSTIC, 4 FR-JR 4

## (undated) DEVICE — CLOSURE SYSTEM, VASCULAR, VASCADE, 5 F

## (undated) DEVICE — CATHETER, DIAGNOSTIC, JUDKINS, LEFT, 5 FR-JL 4.0

## (undated) DEVICE — CATHETER, OPTITORQUE, 5FR, JACKY, 3.5/ 2H/110CM, CURVED

## (undated) DEVICE — DRESSING, AQUACEL AG, HYDROFIBER W/SILVER, 3.5 X 6 IN